# Patient Record
Sex: FEMALE | Race: WHITE | Employment: FULL TIME | URBAN - METROPOLITAN AREA
[De-identification: names, ages, dates, MRNs, and addresses within clinical notes are randomized per-mention and may not be internally consistent; named-entity substitution may affect disease eponyms.]

---

## 2017-08-03 ENCOUNTER — HOSPITAL ENCOUNTER (EMERGENCY)
Age: 22
Discharge: HOME | End: 2017-08-04
Attending: EMERGENCY MEDICINE
Payer: COMMERCIAL

## 2017-08-03 DIAGNOSIS — R19.7 NAUSEA VOMITING AND DIARRHEA: Primary | ICD-10-CM

## 2017-08-03 DIAGNOSIS — R11.2 NAUSEA VOMITING AND DIARRHEA: Primary | ICD-10-CM

## 2017-08-03 LAB
ANION GAP BLD CALC-SCNC: 13 MMOL/L (ref 7–16)
BASOPHILS # BLD AUTO: 0 K/UL (ref 0–0.2)
BASOPHILS # BLD: 0 % (ref 0–2)
BUN SERPL-MCNC: 11 MG/DL (ref 6–23)
CALCIUM SERPL-MCNC: 8.3 MG/DL (ref 8.3–10.4)
CHLORIDE SERPL-SCNC: 103 MMOL/L (ref 98–107)
CO2 SERPL-SCNC: 22 MMOL/L (ref 21–32)
CREAT SERPL-MCNC: 0.85 MG/DL (ref 0.6–1)
DIFFERENTIAL METHOD BLD: ABNORMAL
EOSINOPHIL # BLD: 0.1 K/UL (ref 0–0.8)
EOSINOPHIL NFR BLD: 2 % (ref 0.5–7.8)
ERYTHROCYTE [DISTWIDTH] IN BLOOD BY AUTOMATED COUNT: 13.7 % (ref 11.9–14.6)
GLUCOSE SERPL-MCNC: 102 MG/DL (ref 65–100)
HCG UR QL: NEGATIVE
HCT VFR BLD AUTO: 40.2 % (ref 35.8–46.3)
HGB BLD-MCNC: 13.8 G/DL (ref 11.7–15.4)
IMM GRANULOCYTES # BLD: 0 K/UL (ref 0–0.5)
IMM GRANULOCYTES NFR BLD AUTO: 0.2 % (ref 0–5)
LYMPHOCYTES # BLD AUTO: 12 % (ref 13–44)
LYMPHOCYTES # BLD: 0.6 K/UL (ref 0.5–4.6)
MCH RBC QN AUTO: 27.4 PG (ref 26.1–32.9)
MCHC RBC AUTO-ENTMCNC: 34.3 G/DL (ref 31.4–35)
MCV RBC AUTO: 79.8 FL (ref 79.6–97.8)
MONOCYTES # BLD: 0.3 K/UL (ref 0.1–1.3)
MONOCYTES NFR BLD AUTO: 6 % (ref 4–12)
NEUTS SEG # BLD: 4.2 K/UL (ref 1.7–8.2)
NEUTS SEG NFR BLD AUTO: 80 % (ref 43–78)
PLATELET # BLD AUTO: 196 K/UL (ref 150–450)
PMV BLD AUTO: 10 FL (ref 10.8–14.1)
POTASSIUM SERPL-SCNC: 3.4 MMOL/L (ref 3.5–5.1)
RBC # BLD AUTO: 5.04 M/UL (ref 4.05–5.25)
SODIUM SERPL-SCNC: 138 MMOL/L (ref 136–145)
WBC # BLD AUTO: 5.2 K/UL (ref 4.3–11.1)

## 2017-08-03 PROCEDURE — 96374 THER/PROPH/DIAG INJ IV PUSH: CPT | Performed by: EMERGENCY MEDICINE

## 2017-08-03 PROCEDURE — 74011250636 HC RX REV CODE- 250/636: Performed by: EMERGENCY MEDICINE

## 2017-08-03 PROCEDURE — 99284 EMERGENCY DEPT VISIT MOD MDM: CPT | Performed by: EMERGENCY MEDICINE

## 2017-08-03 PROCEDURE — 85025 COMPLETE CBC W/AUTO DIFF WBC: CPT | Performed by: EMERGENCY MEDICINE

## 2017-08-03 PROCEDURE — 96361 HYDRATE IV INFUSION ADD-ON: CPT | Performed by: EMERGENCY MEDICINE

## 2017-08-03 PROCEDURE — 81003 URINALYSIS AUTO W/O SCOPE: CPT | Performed by: EMERGENCY MEDICINE

## 2017-08-03 PROCEDURE — 74011000250 HC RX REV CODE- 250: Performed by: EMERGENCY MEDICINE

## 2017-08-03 PROCEDURE — 80053 COMPREHEN METABOLIC PANEL: CPT | Performed by: EMERGENCY MEDICINE

## 2017-08-03 PROCEDURE — 80048 BASIC METABOLIC PNL TOTAL CA: CPT | Performed by: EMERGENCY MEDICINE

## 2017-08-03 PROCEDURE — 81025 URINE PREGNANCY TEST: CPT

## 2017-08-03 PROCEDURE — 96375 TX/PRO/DX INJ NEW DRUG ADDON: CPT | Performed by: EMERGENCY MEDICINE

## 2017-08-03 RX ORDER — ONDANSETRON 2 MG/ML
4 INJECTION INTRAMUSCULAR; INTRAVENOUS
Status: COMPLETED | OUTPATIENT
Start: 2017-08-03 | End: 2017-08-03

## 2017-08-03 RX ORDER — FAMOTIDINE 10 MG/ML
20 INJECTION INTRAVENOUS
Status: COMPLETED | OUTPATIENT
Start: 2017-08-03 | End: 2017-08-03

## 2017-08-03 RX ADMIN — FAMOTIDINE 20 MG: 10 INJECTION, SOLUTION INTRAVENOUS at 23:37

## 2017-08-03 RX ADMIN — ONDANSETRON 4 MG: 2 INJECTION INTRAMUSCULAR; INTRAVENOUS at 23:37

## 2017-08-03 RX ADMIN — SODIUM CHLORIDE 2000 ML: 900 INJECTION, SOLUTION INTRAVENOUS at 23:37

## 2017-08-03 NOTE — LETTER
[facility] EMERGENCY DEPT 
[address] [place] [id number] 
[phone] Work/School Note Date: 8/3/2017 To Whom It May concern: 
 
Torres Lopez was seen and treated today in the emergency room by the following provider(s): 
Attending Provider: Lottie Cabrera MD.   
 
Torres Lopez Special Instructions:  Excuse on Friday and Saturday Sincerely, 
 
 
 
 
Lottie Cabrera MD

## 2017-08-03 NOTE — Clinical Note
Clear liquids and advance his nausea and vomiting improved Nausea/vomiting, as needed: Zofran 
diarrhea as needed: Imodium

## 2017-08-04 VITALS
DIASTOLIC BLOOD PRESSURE: 50 MMHG | RESPIRATION RATE: 16 BRPM | BODY MASS INDEX: 29.06 KG/M2 | TEMPERATURE: 100.4 F | HEART RATE: 90 BPM | WEIGHT: 164 LBS | HEIGHT: 63 IN | SYSTOLIC BLOOD PRESSURE: 101 MMHG | OXYGEN SATURATION: 98 %

## 2017-08-04 LAB
ALBUMIN SERPL BCP-MCNC: 3.3 G/DL (ref 3.5–5)
ALBUMIN/GLOB SERPL: 0.8 {RATIO} (ref 1.2–3.5)
ALP SERPL-CCNC: 52 U/L (ref 50–136)
ALT SERPL-CCNC: 25 U/L (ref 12–65)
ANION GAP BLD CALC-SCNC: 13 MMOL/L (ref 7–16)
AST SERPL W P-5'-P-CCNC: 25 U/L (ref 15–37)
BACTERIA SPEC CULT: NEGATIVE
BACTERIA SPEC CULT: NORMAL
BILIRUB SERPL-MCNC: 0.6 MG/DL (ref 0.2–1.1)
BUN SERPL-MCNC: 11 MG/DL (ref 6–23)
CALCIUM SERPL-MCNC: 8.2 MG/DL (ref 8.3–10.4)
CHLORIDE SERPL-SCNC: 102 MMOL/L (ref 98–107)
CO2 SERPL-SCNC: 22 MMOL/L (ref 21–32)
CREAT SERPL-MCNC: 0.81 MG/DL (ref 0.6–1)
GLOBULIN SER CALC-MCNC: 3.9 G/DL (ref 2.3–3.5)
GLUCOSE SERPL-MCNC: 100 MG/DL (ref 65–100)
POTASSIUM SERPL-SCNC: 3.3 MMOL/L (ref 3.5–5.1)
PROT SERPL-MCNC: 7.2 G/DL (ref 6.3–8.2)
SERVICE CMNT-IMP: NORMAL
SODIUM SERPL-SCNC: 137 MMOL/L (ref 136–145)

## 2017-08-04 PROCEDURE — 87493 C DIFF AMPLIFIED PROBE: CPT | Performed by: EMERGENCY MEDICINE

## 2017-08-04 PROCEDURE — 74011250637 HC RX REV CODE- 250/637: Performed by: EMERGENCY MEDICINE

## 2017-08-04 RX ORDER — LOPERAMIDE HYDROCHLORIDE 2 MG/1
2 CAPSULE ORAL
Status: COMPLETED | OUTPATIENT
Start: 2017-08-04 | End: 2017-08-04

## 2017-08-04 RX ORDER — ONDANSETRON 8 MG/1
8 TABLET, ORALLY DISINTEGRATING ORAL
Qty: 8 TAB | Refills: 1 | Status: SHIPPED | OUTPATIENT
Start: 2017-08-04 | End: 2017-12-28

## 2017-08-04 RX ADMIN — LOPERAMIDE HYDROCHLORIDE 2 MG: 2 CAPSULE ORAL at 01:15

## 2017-08-04 NOTE — ED TRIAGE NOTES
Diarrhea started yesterday. Nausea, vomiting and fever started today.   States that her abd is cramping

## 2017-08-04 NOTE — DISCHARGE INSTRUCTIONS
Nausea and Vomiting: Care Instructions  Your Care Instructions    When you are nauseated, you may feel weak and sweaty and notice a lot of saliva in your mouth. Nausea often leads to vomiting. Most of the time you do not need to worry about nausea and vomiting, but they can be signs of other illnesses. Two common causes of nausea and vomiting are stomach flu and food poisoning. Nausea and vomiting from viral stomach flu will usually start to improve within 24 hours. Nausea and vomiting from food poisoning may last from 12 to 48 hours. The doctor has checked you carefully, but problems can develop later. If you notice any problems or new symptoms, get medical treatment right away. Follow-up care is a key part of your treatment and safety. Be sure to make and go to all appointments, and call your doctor if you are having problems. It's also a good idea to know your test results and keep a list of the medicines you take. How can you care for yourself at home? · To prevent dehydration, drink plenty of fluids, enough so that your urine is light yellow or clear like water. Choose water and other caffeine-free clear liquids until you feel better. If you have kidney, heart, or liver disease and have to limit fluids, talk with your doctor before you increase the amount of fluids you drink. · Rest in bed until you feel better. · When you are able to eat, try clear soups, mild foods, and liquids until all symptoms are gone for 12 to 48 hours. Other good choices include dry toast, crackers, cooked cereal, and gelatin dessert, such as Jell-O. When should you call for help? Call 911 anytime you think you may need emergency care. For example, call if:  · You passed out (lost consciousness). Call your doctor now or seek immediate medical care if:  · You have symptoms of dehydration, such as:  ¨ Dry eyes and a dry mouth. ¨ Passing only a little dark urine.   ¨ Feeling thirstier than usual.  · You have new or worsening belly pain. · You have a new or higher fever. · You vomit blood or what looks like coffee grounds. Watch closely for changes in your health, and be sure to contact your doctor if:  · You have ongoing nausea and vomiting. · Your vomiting is getting worse. · Your vomiting lasts longer than 2 days. · You are not getting better as expected. Where can you learn more? Go to http://harshil-heydi.info/. Enter 25 [id number] in the search box to learn more about \"Nausea and Vomiting: Care Instructions. \"  Current as of: March 20, 2017  Content Version: 11.3  © 4265-8167 [employer]. Care instructions adapted under license by [employer] (which disclaims liability or warranty for this information). If you have questions about a medical condition or this instruction, always ask your healthcare professional. [address] any warranty or liability for your use of this information. Diarrhea: Care Instructions  Your Care Instructions    Diarrhea is loose, watery stools (bowel movements). The exact cause is often hard to find. Sometimes diarrhea is your body's way of getting rid of what caused an upset stomach. Viruses, food poisoning, and many medicines can cause diarrhea. Some people get diarrhea in response to emotional stress, anxiety, or certain foods. Almost everyone has diarrhea now and then. It usually isn't serious, and your stools will return to normal soon. The important thing to do is replace the fluids you have lost, so you can prevent dehydration. The doctor has checked you carefully, but problems can develop later. If you notice any problems or new symptoms, get medical treatment right away. Follow-up care is a key part of your treatment and safety. Be sure to make and go to all appointments, and call your doctor if you are having problems. It's also a good idea to know your test results and keep a list of the medicines you take.   How can you care for yourself at home? · Watch for signs of dehydration, which means your body has lost too much water. Dehydration is a serious condition and should be treated right away. Signs of dehydration are:  ¨ Increasing thirst and dry eyes and mouth. ¨ Feeling faint or lightheaded. ¨ Darker urine, and a smaller amount of urine than normal.  · To prevent dehydration, drink plenty of fluids, enough so that your urine is light yellow or clear like water. Choose water and other caffeine-free clear liquids until you feel better. If you have kidney, heart, or liver disease and have to limit fluids, talk with your doctor before you increase the amount of fluids you drink. · Begin eating small amounts of mild foods the next day, if you feel like it. ¨ Try yogurt that has live cultures of Lactobacillus. (Check the label.)  ¨ Avoid spicy foods, fruits, alcohol, and caffeine until 48 hours after all symptoms are gone. ¨ Avoid chewing gum that contains sorbitol. ¨ Avoid dairy products (except for yogurt with Lactobacillus) while you have diarrhea and for 3 days after symptoms are gone. · The doctor may recommend that you take over-the-counter medicine, such as loperamide (Imodium), if you still have diarrhea after 6 hours. Read and follow all instructions on the label. Do not use this medicine if you have bloody diarrhea, a high fever, or other signs of serious illness. Call your doctor if you think you are having a problem with your medicine. When should you call for help? Call 911 anytime you think you may need emergency care. For example, call if:  · You passed out (lost consciousness). · Your stools are maroon or very bloody. Call your doctor now or seek immediate medical care if:  · You are dizzy or lightheaded, or you feel like you may faint. · Your stools are black and look like tar, or they have streaks of blood. · You have new or worse belly pain.   · You have symptoms of dehydration, such as:  ¨ Dry eyes and a dry mouth. ¨ Passing only a little dark urine. ¨ Feeling thirstier than usual.  · You have a new or higher fever. Watch closely for changes in your health, and be sure to contact your doctor if:  · Your diarrhea is getting worse. · You see pus in the diarrhea. · You are not getting better after 2 days (48 hours). Where can you learn more? Go to http://harshil-heydi.info/. Enter B034 in the search box to learn more about \"Diarrhea: Care Instructions. \"  Current as of: March 20, 2017  Content Version: 11.3  © 3613-8682 [employer]. Care instructions adapted under license by [employer] (which disclaims liability or warranty for this information). If you have questions about a medical condition or this instruction, always ask your healthcare professional. [address] any warranty or liability for your use of this information.

## 2018-01-03 PROBLEM — Z34.80 PRENATAL CARE OF MULTIGRAVIDA, ANTEPARTUM: Status: ACTIVE | Noted: 2018-01-03

## 2018-07-02 PROBLEM — Z34.80 PRENATAL CARE OF MULTIGRAVIDA, ANTEPARTUM: Status: RESOLVED | Noted: 2018-01-03 | Resolved: 2018-07-02

## 2018-07-03 ENCOUNTER — HOSPITAL ENCOUNTER (OUTPATIENT)
Dept: MAMMOGRAPHY | Age: 23
Discharge: HOME | End: 2018-07-03
Attending: OBSTETRICS & GYNECOLOGY
Payer: COMMERCIAL

## 2018-07-03 DIAGNOSIS — N63.10 BREAST MASS, RIGHT: ICD-10-CM

## 2018-07-03 PROCEDURE — 76642 ULTRASOUND BREAST LIMITED: CPT

## 2018-10-15 PROBLEM — Z34.80 PRENATAL CARE OF MULTIGRAVIDA, ANTEPARTUM: Status: ACTIVE | Noted: 2018-10-15

## 2018-12-29 ENCOUNTER — HOSPITAL ENCOUNTER (OUTPATIENT)
Age: 23
Discharge: HOME | End: 2018-12-29
Attending: OBSTETRICS & GYNECOLOGY | Admitting: OBSTETRICS & GYNECOLOGY
Payer: COMMERCIAL

## 2018-12-29 VITALS
WEIGHT: 177 LBS | RESPIRATION RATE: 16 BRPM | HEIGHT: 63 IN | DIASTOLIC BLOOD PRESSURE: 79 MMHG | SYSTOLIC BLOOD PRESSURE: 114 MMHG | HEART RATE: 99 BPM | TEMPERATURE: 98.1 F | BODY MASS INDEX: 31.36 KG/M2

## 2018-12-29 PROBLEM — O26.892 PELVIC PAIN IN ANTEPARTUM PERIOD IN SECOND TRIMESTER: Status: ACTIVE | Noted: 2018-12-29

## 2018-12-29 PROBLEM — R10.2 PELVIC PAIN IN ANTEPARTUM PERIOD IN SECOND TRIMESTER: Status: ACTIVE | Noted: 2018-12-29

## 2018-12-29 LAB
ALBUMIN SERPL-MCNC: 2.9 G/DL (ref 3.5–5)
ALBUMIN/GLOB SERPL: 0.8 {RATIO}
ALP SERPL-CCNC: 43 U/L (ref 50–136)
ALT SERPL-CCNC: 17 U/L (ref 12–65)
ANION GAP SERPL CALC-SCNC: 8 MMOL/L
AST SERPL-CCNC: 12 U/L (ref 15–37)
BILIRUB SERPL-MCNC: 0.4 MG/DL (ref 0.2–1.1)
BUN SERPL-MCNC: 10 MG/DL (ref 6–23)
CALCIUM SERPL-MCNC: 8.5 MG/DL (ref 8.3–10.4)
CHLORIDE SERPL-SCNC: 108 MMOL/L (ref 98–107)
CO2 SERPL-SCNC: 22 MMOL/L (ref 21–32)
CREAT SERPL-MCNC: 0.58 MG/DL (ref 0.6–1)
ERYTHROCYTE [DISTWIDTH] IN BLOOD BY AUTOMATED COUNT: 13.3 % (ref 11.9–14.6)
GLOBULIN SER CALC-MCNC: 3.5 G/DL (ref 2.3–3.5)
GLUCOSE SERPL-MCNC: 94 MG/DL (ref 65–100)
HCT VFR BLD AUTO: 33.6 % (ref 35.8–46.3)
HGB BLD-MCNC: 11 G/DL (ref 11.7–15.4)
MCH RBC QN AUTO: 30.2 PG (ref 26.1–32.9)
MCHC RBC AUTO-ENTMCNC: 32.7 G/DL (ref 31.4–35)
MCV RBC AUTO: 92.3 FL (ref 79.6–97.8)
NRBC # BLD: 0 K/UL (ref 0–0.2)
PLATELET # BLD AUTO: 204 K/UL (ref 150–450)
PMV BLD AUTO: 9.8 FL (ref 9.4–12.3)
POTASSIUM SERPL-SCNC: 4.2 MMOL/L (ref 3.5–5.1)
PROT SERPL-MCNC: 6.4 G/DL
RBC # BLD AUTO: 3.64 M/UL (ref 4.05–5.2)
SODIUM SERPL-SCNC: 138 MMOL/L (ref 136–145)
WBC # BLD AUTO: 8.3 K/UL (ref 4.3–11.1)

## 2018-12-29 PROCEDURE — 85027 COMPLETE CBC AUTOMATED: CPT

## 2018-12-29 PROCEDURE — 99284 EMERGENCY DEPT VISIT MOD MDM: CPT

## 2018-12-29 PROCEDURE — 87086 URINE CULTURE/COLONY COUNT: CPT

## 2018-12-29 PROCEDURE — 59025 FETAL NON-STRESS TEST: CPT

## 2018-12-29 PROCEDURE — 80053 COMPREHEN METABOLIC PANEL: CPT

## 2018-12-29 RX ORDER — CEPHALEXIN 500 MG/1
500 CAPSULE ORAL 3 TIMES DAILY
Qty: 21 CAP | Refills: 0 | Status: SHIPPED | OUTPATIENT
Start: 2018-12-29 | End: 2019-01-05

## 2018-12-30 NOTE — PROGRESS NOTES
DC orders received. DC instructions given to pt. Pt verbalized understanding. Pt dc home accompanied by friend.

## 2018-12-30 NOTE — ED PROVIDER NOTES
Chief Complaint:syncopal episode and pelvic cramping      20 y.o. female [id number] at 19w6d  weeks gestation who is seen for moderate abdominal pain. Pt reports that she had a syncopal episode today while standing in line at [employer]. After that she began having pelvic cramping. No loss of fluid. No vag bleeding. Good fetal movement. No urinary symptoms except for cramping after emptying bladder. HISTORY:    Social History     Substance and Sexual Activity   Sexual Activity Yes    Partners: Male    Birth control/protection: None    Comment: Pregnant     Patient's last menstrual period was 08/12/2018 (exact date).     Social History     Socioeconomic History    Marital status: SINGLE     Spouse name: Not on file    Number of children: 1    Years of education: 15    Highest education level: Not on file   Social Needs    Financial resource strain: Not on file    Food insecurity - worry: Not on file    Food insecurity - inability: Not on file   [employer] needs - medical: Not on file   [employer] needs - non-medical: Not on file   Occupational History    Not on file   Tobacco Use    Smoking status: Never Smoker    Smokeless tobacco: Never Used   Substance and Sexual Activity    Alcohol use: No     Comment: social/none since + UC    Drug use: No    Sexual activity: Yes     Partners: Male     Birth control/protection: None     Comment: Pregnant   Other Topics Concern     Service Not Asked    Blood Transfusions Not Asked    Caffeine Concern Not Asked    Occupational Exposure Not Asked    Hobby Hazards Not Asked    Sleep Concern Not Asked    Stress Concern Not Asked    Weight Concern Not Asked    Special Diet Not Asked    Back Care Not Asked    Exercise Not Asked    Bike Helmet Not Asked   [address] Not Asked    Self-Exams Not Asked   Social History Narrative    Not on file       Past Surgical History:   Procedure Laterality Date    HX CHOLECYSTECTOMY      HX CYST INCISION AND DRAINAGE  1/31/2014    RIGHT INCISION AND DRAINAGE BREAST  performed by Makayla Vaz MD at [address] HX CYST INCISION AND DRAINAGE  2/11/2014    INCISION AND DRAINAGE RIGHT BREAST performed by [facility] at [facility] MAIN OR    HX OTHER SURGICAL      HX WISDOM TEETH EXTRACTION         Past Medical History:   Diagnosis Date    Anemia     Anemia in pregnancy 2/25/2016    Celiac disease     Genital herpes, unspecified     History    Herpes 1/10/2014    History of chicken pox     Mastitis     2 surgeries/right breast    Miscarriage within last 12 months 01/2018    Palpitations     Postpartum depression     no meds currently    Rh negative state in antepartum period 9/19/2013    Rhesus isoimmunization affecting pregnancy     Supervision of normal subsequent pregnancy 10/9/2015    1)HSV 2)rh neg 3)rubella nonimmune 4)anemia     Syncope and collapse     Unspecified ovarian cysts          ROS:  A 12 point review of symptoms negative except for chief complaint as described above. PHYSICAL EXAM:  Blood pressure 114/79, pulse 99, temperature 98.1 °F (36.7 °C), resp. rate 16, height 5' 3\" (1.6 m), weight 80.3 kg (177 lb), last menstrual period 08/12/2018, not currently breastfeeding. Constitutional: The patient appears well, alert, oriented x 3. Cardiovascular: Heart RRR, no murmurs.    Respiratory: Lungs clear, no respiratory distress  GI: Abdomen soft, nontender, no guarding  No fundal tenderness  Musculoskeletal: no cva tenderness  Upper ext: no edema, reflexes +2  Lower ext: no edema, neg paola's, reflexes +2  Skin: no rashes or lesions  Psychiatric:Mood/ Affect: appropriate  Genitourinary: SVE:cl/th- no discharge or bleeding  FHT:+  TOCO:no contractions seen  Bedside ultrasound- subjectively normal trev , lots of fetal movement, tolerated exam without pain  Recent Results (from the past 12 hour(s))   CBC W/O DIFF    Collection Time: 12/29/18  9:41 PM   Result Value Ref Range    WBC 8.3 4.3 - 11.1 K/uL    RBC 3.64 (L) 4.05 - 5.2 M/uL    HGB 11.0 (L) 11.7 - 15.4 g/dL    HCT 33.6 (L) 35.8 - 46.3 %    MCV 92.3 79.6 - 97.8 FL    MCH 30.2 26.1 - 32.9 PG    MCHC 32.7 31.4 - 35.0 g/dL    RDW 13.3 11.9 - 14.6 %    PLATELET 332 382 - 541 K/uL    MPV 9.8 9.4 - 12.3 FL    ABSOLUTE NRBC 0.00 0.0 - 0.2 K/uL   METABOLIC PANEL, COMPREHENSIVE    Collection Time: 12/29/18  9:41 PM   Result Value Ref Range    Sodium 138 136 - 145 mmol/L    Potassium 4.2 3.5 - 5.1 mmol/L    Chloride 108 (H) 98 - 107 mmol/L    CO2 22 21 - 32 mmol/L    Anion gap 8 mmol/L    Glucose 94 65 - 100 mg/dL    BUN 10 6 - 23 MG/DL    Creatinine 0.58 (L) 0.6 - 1.0 MG/DL    GFR est AA >60 >60 ml/min/1.73m2    GFR est non-AA >60 ml/min/1.73m2    Calcium 8.5 8.3 - 10.4 MG/DL    Bilirubin, total 0.4 0.2 - 1.1 MG/DL    ALT (SGPT) 17 12 - 65 U/L    AST (SGOT) 12 (L) 15 - 37 U/L    Alk.  phosphatase 43 (L) 50 - 136 U/L    Protein, total 6.4 g/dL    Albumin 2.9 (L) 3.5 - 5.0 g/dL    Globulin 3.5 2.3 - 3.5 g/dL    A-G Ratio 0.8       I personally reviewed pt's medical record including relevant labs and ultrasounds    Assessment/Plan:  19 yo [id number] at 19w6d with near syncopy and pelvic cramping  Urine culture sent- will call if +; rx keflex given to hold if results are +  Normal labs except mild anemia- continue pnv and iron  Encourage hydration  Keep f/u as scheduled

## 2018-12-30 NOTE — PROGRESS NOTES
Pt presents to [facility] with complaint of abd cramping and says earlier in the day she blacked out but did not fall and hit her head. Pt denies VB or SROM. Dr. Amy Gonzalez to be notified of pt arrival. Doppler .

## 2018-12-30 NOTE — DISCHARGE INSTRUCTIONS
IT WAS A PLEASURE TAKING CARE OF YOU TONIGHT! PLEASE FOLLOW UP WITH PRIMARY OB PHYSICIAN AT NEXT SCHEDULED APT. RETURN TO [facility] IF EXPERIENCING VAGINAL BLEEDING, CONTRACTIONS THAT BECOME STRONGER AND CLOSER TOGETHER, DECREASED FETAL MOVEMENT, OR LEAKING OF FLUID. Weeks 18 to 22 of Your Pregnancy: Care Instructions  Your Care Instructions    Your baby is continuing to develop quickly. At this stage, babies can now suck their thumbs,  firmly with their hands, and open and close their eyelids. Sometime between 18 and 22 weeks, you will start to feel your baby move. At first, these small fetal movements feel like fluttering or \"butterflies. \" Some women say that they feel like gas bubbles. As the baby grows, these movements will become stronger. You may also notice that your baby kicks and hiccups. During this time, you may find that your nausea and fatigue are gone. Overall, you may feel better and have more energy than you did in your first trimester. But you may also have new discomforts now, such as sleep problems or leg cramps. This care sheet can help you ease these discomforts. Follow-up care is a key part of your treatment and safety. Be sure to make and go to all appointments, and call your doctor if you are having problems. It's also a good idea to know your test results and keep a list of the medicines you take. How can you care for yourself at home? Ease sleep problems  · Avoid caffeine in drinks or chocolate late in the day. · Get some exercise every day. · Take a warm shower or bath before bed. · Have a light snack or glass of milk at bedtime. · Do relaxation exercises in bed to calm your mind and body. · Support your legs and back with extra pillows. Try a pillow between your legs if you sleep on your side. · Do not use sleeping pills or alcohol. They could harm your baby. Ease leg cramps  · Do not massage your calf during the cramp. · Sit on a firm bed or chair.  Straighten your leg, and bend your foot (flex your ankle) slowly upward, toward your knee. Bend your toes up and down. · Stand on a cool, flat surface. Stretch your toes upward, and take small steps walking on your heels. · Use a heating pad or hot water bottle to help with muscle ache. Prevent leg cramps  · Be sure to get enough calcium. If you are worried that you are not getting enough, talk to your doctor. · Exercise every day, and stretch your legs before bed. · Take a warm bath before bed, and try leg warmers at night. Where can you learn more? Go to http://harshil-heydi.info/. Enter W767 in the search box to learn more about \"Weeks 18 to 22 of Your Pregnancy: Care Instructions. \"  Current as of: 2017  Content Version: 11.8  © 5997-5654 [employer]. Care instructions adapted under license by [employer] (which disclaims liability or warranty for this information). If you have questions about a medical condition or this instruction, always ask your healthcare professional. [place] [address] any warranty or liability for your use of this information. Pregnancy Precautions: Care Instructions  Your Care Instructions    There is no sure way to prevent labor before your due date ( labor) or to prevent most other pregnancy problems. But there are things you can do to increase your chances of a healthy pregnancy. Go to your appointments, follow your doctor's advice, and take good care of yourself. Eat well, and exercise (if your doctor agrees). And make sure to drink plenty of water. Follow-up care is a key part of your treatment and safety. Be sure to make and go to all appointments, and call your doctor if you are having problems. It's also a good idea to know your test results and keep a list of the medicines you take. How can you care for yourself at home? · Make sure you go to your prenatal appointments.  At each visit, your doctor will check your blood pressure. Your doctor will also check to see if you have protein in your urine. High blood pressure and protein in urine are signs of preeclampsia. This condition can be dangerous for you and your baby. · Drink plenty of fluids, enough so that your urine is light yellow or clear like water. Dehydration can cause contractions. If you have kidney, heart, or liver disease and have to limit fluids, talk with your doctor before you increase the amount of fluids you drink. · Tell your doctor right away if you notice any symptoms of an infection, such as:  ? Burning when you urinate. ? A foul-smelling discharge from your vagina. ? Vaginal itching. ? Unexplained fever. ? Unusual pain or soreness in your uterus or lower belly. · Eat a balanced diet. Include plenty of foods that are high in calcium and iron. ? Foods high in calcium include milk, cheese, yogurt, almonds, and broccoli. ? Foods high in iron include red meat, shellfish, poultry, eggs, beans, raisins, whole-grain bread, and leafy green vegetables. · Do not smoke. If you need help quitting, talk to your doctor about stop-smoking programs and medicines. These can increase your chances of quitting for good. · Do not drink alcohol or use illegal drugs. · Follow your doctor's directions about activity. Your doctor will let you know how much, if any, exercise you can do. · Ask your doctor if you can have sex. If you are at risk for early labor, your doctor may ask you to not have sex. · Take care to prevent falls. During pregnancy, your joints are loose, and your balance is off. Sports such as bicycling, skiing, or in-line skating can increase your risk of falling. And don't ride horses or motorcycles, dive, water ski, scuba dive, or parachute jump while you are pregnant. · Avoid getting very hot. Do not use saunas or hot tubs. Avoid staying out in the sun in hot weather for long periods.  Take acetaminophen (Tylenol) to lower a high fever.  · Do not take any over-the-counter or herbal medicines or supplements without talking to your doctor or pharmacist first.  When should you call for help? Call 911 anytime you think you may need emergency care. For example, call if:    · You passed out (lost consciousness).     · You have severe vaginal bleeding.     · You have severe pain in your belly or pelvis.     · You have had fluid gushing or leaking from your vagina and you know or think the umbilical cord is bulging into your vagina. If this happens, immediately get down on your knees so your rear end (buttocks) is higher than your head. This will decrease the pressure on the cord until help arrives.   [facility] your doctor now or seek immediate medical care if:    · You have signs of preeclampsia, such as:  ? Sudden swelling of your face, hands, or feet. ? New vision problems (such as dimness or blurring). ? A severe headache.     · You have any vaginal bleeding.     · You have belly pain or cramping.     · You have a fever.     · You have had regular contractions (with or without pain) for an hour. This means that you have 8 or more within 1 hour or 4 or more in 20 minutes after you change your position and drink fluids.     · You have a sudden release of fluid from your vagina.     · You have low back pain or pelvic pressure that does not go away.     · You notice that your baby has stopped moving or is moving much less than normal.    Watch closely for changes in your health, and be sure to contact your doctor if you have any problems. Where can you learn more? Go to http://harshil-heydi.info/. Enter [id number] in the search box to learn more about \"Pregnancy Precautions: Care Instructions. \"  Current as of: November 21, 2017  Content Version: 11.8  © 1428-1978 [employer]. Care instructions adapted under license by [employer] (which disclaims liability or warranty for this information).  If you have questions about a medical condition or this instruction, always ask your healthcare professional. [place] [address] any warranty or liability for your use of this information.

## 2019-01-01 LAB
BACTERIA SPEC CULT: NORMAL
SERVICE CMNT-IMP: NORMAL

## 2019-03-01 ENCOUNTER — HOSPITAL ENCOUNTER (OUTPATIENT)
Age: 24
Discharge: HOME | End: 2019-03-01
Attending: OBSTETRICS & GYNECOLOGY | Admitting: OBSTETRICS & GYNECOLOGY
Payer: COMMERCIAL

## 2019-03-01 VITALS
HEIGHT: 63 IN | WEIGHT: 181 LBS | RESPIRATION RATE: 18 BRPM | HEART RATE: 108 BPM | DIASTOLIC BLOOD PRESSURE: 69 MMHG | SYSTOLIC BLOOD PRESSURE: 119 MMHG | BODY MASS INDEX: 32.07 KG/M2 | TEMPERATURE: 99.6 F

## 2019-03-01 PROBLEM — O26.899 ANTEPARTUM DEHYDRATION: Status: ACTIVE | Noted: 2019-03-01

## 2019-03-01 PROBLEM — R11.2 NAUSEA AND VOMITING: Status: ACTIVE | Noted: 2019-03-01

## 2019-03-01 PROBLEM — E86.0 ANTEPARTUM DEHYDRATION: Status: ACTIVE | Noted: 2019-03-01

## 2019-03-01 LAB
GLUCOSE, GLUUPC: NEGATIVE
KETONES UR-MCNC: NORMAL MG/DL
PROT UR QL: NORMAL

## 2019-03-01 PROCEDURE — 59025 FETAL NON-STRESS TEST: CPT

## 2019-03-01 PROCEDURE — 74011250636 HC RX REV CODE- 250/636: Performed by: OBSTETRICS & GYNECOLOGY

## 2019-03-01 PROCEDURE — 99283 EMERGENCY DEPT VISIT LOW MDM: CPT

## 2019-03-01 PROCEDURE — 96360 HYDRATION IV INFUSION INIT: CPT

## 2019-03-01 PROCEDURE — 74011000250 HC RX REV CODE- 250: Performed by: OBSTETRICS & GYNECOLOGY

## 2019-03-01 PROCEDURE — 81002 URINALYSIS NONAUTO W/O SCOPE: CPT | Performed by: OBSTETRICS & GYNECOLOGY

## 2019-03-01 PROCEDURE — 96375 TX/PRO/DX INJ NEW DRUG ADDON: CPT | Performed by: OBSTETRICS & GYNECOLOGY

## 2019-03-01 PROCEDURE — 96374 THER/PROPH/DIAG INJ IV PUSH: CPT

## 2019-03-01 RX ORDER — ONDANSETRON 4 MG/1
4 TABLET, ORALLY DISINTEGRATING ORAL
Qty: 10 TAB | Refills: 0 | Status: SHIPPED | OUTPATIENT
Start: 2019-03-01 | End: 2019-04-11

## 2019-03-01 RX ORDER — ONDANSETRON 4 MG/1
4 TABLET, ORALLY DISINTEGRATING ORAL
Qty: 10 TAB | Refills: 0 | OUTPATIENT
Start: 2019-03-01 | End: 2019-03-01 | Stop reason: SDUPTHER

## 2019-03-01 RX ORDER — SODIUM CHLORIDE, SODIUM LACTATE, POTASSIUM CHLORIDE, CALCIUM CHLORIDE 600; 310; 30; 20 MG/100ML; MG/100ML; MG/100ML; MG/100ML
999 INJECTION, SOLUTION INTRAVENOUS CONTINUOUS
Status: DISPENSED | OUTPATIENT
Start: 2019-03-01 | End: 2019-03-01

## 2019-03-01 RX ORDER — DEXTROSE, SODIUM CHLORIDE, SODIUM LACTATE, POTASSIUM CHLORIDE, AND CALCIUM CHLORIDE 5; .6; .31; .03; .02 G/100ML; G/100ML; G/100ML; G/100ML; G/100ML
150 INJECTION, SOLUTION INTRAVENOUS CONTINUOUS
Status: DISCONTINUED | OUTPATIENT
Start: 2019-03-01 | End: 2019-03-02 | Stop reason: HOSPADM

## 2019-03-01 RX ADMIN — PROMETHAZINE HYDROCHLORIDE 12.5 MG: 25 INJECTION INTRAMUSCULAR; INTRAVENOUS at 20:59

## 2019-03-01 RX ADMIN — SODIUM CHLORIDE, SODIUM LACTATE, POTASSIUM CHLORIDE, AND CALCIUM CHLORIDE 999 ML/HR: 600; 310; 30; 20 INJECTION, SOLUTION INTRAVENOUS at 20:55

## 2019-03-01 RX ADMIN — SODIUM CHLORIDE, SODIUM LACTATE, POTASSIUM CHLORIDE, CALCIUM CHLORIDE, AND DEXTROSE MONOHYDRATE 150 ML/HR: 600; 310; 30; 20; 5 INJECTION, SOLUTION INTRAVENOUS at 21:30

## 2019-03-02 NOTE — ED PROVIDER NOTES
Chief Complaint: nausea and vomiting 
 
 
20 y.o. female [id number] at 28w5d 
weeks gestation who is seen for nausea and vomiting. Pt reports she began with nausea and vomiting early this am around 3:00. Unable to tolerate po today. Pt reports fetal movement was decreased today until arriving in triage. Reports abdominal \"soreness\" and occasional crampy pain. No diarrhea. HISTORY: 
 
Social History Substance and Sexual Activity Sexual Activity Yes  Partners: Male  Birth control/protection: None Comment: Pregnant Patient's last menstrual period was 08/12/2018 (exact date). Social History Socioeconomic History  Marital status: SINGLE Spouse name: Not on file  Number of children: 1  Years of education: 15  
 Highest education level: Not on file Social Needs  Financial resource strain: Not on file  Food insecurity - worry: Not on file  Food insecurity - inability: Not on file  Transportation needs - medical: Not on file  Transportation needs - non-medical: Not on file Occupational History  Not on file Tobacco Use  Smoking status: Never Smoker  Smokeless tobacco: Never Used Substance and Sexual Activity  Alcohol use: No  
  Comment: social/none since + UCG  Drug use: No  
 Sexual activity: Yes  
  Partners: Male Birth control/protection: None Comment: Pregnant Other Topics Concern [address] Service Not Asked  Blood Transfusions Not Asked  Caffeine Concern Not Asked  Occupational Exposure Not Asked [place]ing Hazards Not Asked  Sleep Concern Not Asked  Stress Concern Not Asked  Weight Concern Not Asked  Special Diet Not Asked  Back Care Not Asked  Exercise Not Asked  Bike Helmet Not Asked  Seat Belt Not Asked  Self-Exams Not Asked Social History Narrative  Not on file Past Surgical History:  
Procedure Laterality Date  HX CHOLECYSTECTOMY  HX CYST INCISION AND DRAINAGE  2014 RIGHT INCISION AND DRAINAGE BREAST  performed by Marcel Maloney MD at [address] [place] [address] HX CYST INCISION AND DRAINAGE  2014 INCISION AND DRAINAGE RIGHT BREAST performed by Marcel Maloney MD at [facility] MAIN OR  
 HX OTHER SURGICAL    
 HX WISDOM TEETH EXTRACTION Past Medical History:  
Diagnosis Date  Anemia  Anemia in pregnancy 2016  Celiac disease  Genital herpes, unspecified History  Herpes 1/10/2014  History of chicken pox  Mastitis 2 surgeries/right breast  
 Miscarriage within last 12 months 2018  Palpitations  Postpartum depression   
 no meds currently  Rh negative state in antepartum period 2013  Rhesus isoimmunization affecting pregnancy  Supervision of normal subsequent pregnancy 10/9/2015  
 1)HSV 2)rh neg 3)rubella nonimmune 4)anemia  Syncope and collapse  Unspecified ovarian cysts ROS: 
A 12 point review of symptoms negative except for chief complaint as described above. PHYSICAL EXAM: 
Blood pressure 119/69, pulse (!) 108, temperature 99.6 °F (37.6 °C), resp. rate 18, height 5' 3\" (1.6 m), weight 82.1 kg (181 lb), last menstrual period 2018, not currently breastfeeding. Constitutional: The patient appears well, alert, oriented x 3. Cardiovascular: Heart RRR, no murmurs. Respiratory: Lungs clear, no respiratory distress GI: Abdomen soft, nontender, no guarding No fundal tenderness Musculoskeletal: no cva tenderness Upper ext: no edema, reflexes +2 Lower ext: no edema, neg paola's, reflexes +2 Skin: no rashes or lesions Psychiatric:Mood/ Affect: appropriate Genitourinary: SVE:deferred FHT:appropriate gest age TOCO:rare contraction 
ua - large ketones I personally reviewed pt's medical record including relevant labs and ultrasounds Assessment/Plan: 
21 yo  at 28w5d with acute gastroenteritis- improving but unable to keep much down Will iv hydrate and phenergan iv 
rx zofran

## 2019-03-02 NOTE — PROGRESS NOTES
Pt presents to [facility] with complaints of N/V since this morning. Pt states she called her OB office and was told to rest and stay hydrated and if symptoms didn't get better to go to hospital to be evaluated. Dr. Myriam Ballard aware.

## 2019-03-02 NOTE — DISCHARGE INSTRUCTIONS
DISCHARGE SUMMARY from Nurse    PATIENT INSTRUCTIONS:    After general anesthesia or intravenous sedation, for 24 hours or while taking prescription Narcotics:  · Limit your activities  · Do not drive and operate hazardous machinery  · Do not make important personal or business decisions  · Do  not drink alcoholic beverages  · If you have not urinated within 8 hours after discharge, please contact your surgeon on call. Report the following to your surgeon:  · Excessive pain, swelling, redness or odor of or around the surgical area  · Temperature over 100.5  · Nausea and vomiting lasting longer than 4 hours or if unable to take medications  · Any signs of decreased circulation or nerve impairment to extremity: change in color, persistent  numbness, tingling, coldness or increase pain  · Any questions    What to do at Home:  Recommended activity: Activity as tolerated, no driving and drink plenty of fluids. If you experience any of the following symptoms your water breaks, more than 6 contractions in an hour, and/or bright red vaginal bleeding please follow up with your physician or [facility]. *  Please give a list of your current medications to your Primary Care Provider. *  Please update this list whenever your medications are discontinued, doses are      changed, or new medications (including over-the-counter products) are added. *  Please carry medication information at all times in case of emergency situations. These are general instructions for a healthy lifestyle:    No smoking/ No tobacco products/ Avoid exposure to second hand smoke  Surgeon General's Warning:  Quitting smoking now greatly reduces serious risk to your health.     Obesity, smoking, and sedentary lifestyle greatly increases your risk for illness    A healthy diet, regular physical exercise & weight monitoring are important for maintaining a healthy lifestyle    You may be retaining fluid if you have a history of heart failure or if you experience any of the following symptoms:  Weight gain of 3 pounds or more overnight or 5 pounds in a week, increased swelling in our hands or feet or shortness of breath while lying flat in bed. Please call your doctor as soon as you notice any of these symptoms; do not wait until your next office visit. Recognize signs and symptoms of STROKE:    F-face looks uneven    A-arms unable to move or move unevenly    S-speech slurred or non-existent    T-time-call 911 as soon as signs and symptoms begin-DO NOT go       Back to bed or wait to see if you get better-TIME IS BRAIN. Warning Signs of HEART ATTACK     Call 911 if you have these symptoms:   Chest discomfort. Most heart attacks involve discomfort in the center of the chest that lasts more than a few minutes, or that goes away and comes back. It can feel like uncomfortable pressure, squeezing, fullness, or pain.  Discomfort in other areas of the upper body. Symptoms can include pain or discomfort in one or both arms, the back, neck, jaw, or stomach.  Shortness of breath with or without chest discomfort.  Other signs may include breaking out in a cold sweat, nausea, or lightheadedness. Don't wait more than five minutes to call 911 - MINUTES MATTER! Fast action can save your life. Calling 911 is almost always the fastest way to get lifesaving treatment. Emergency Medical Services staff can begin treatment when they arrive -- up to an hour sooner than if someone gets to the hospital by car. The discharge information has been reviewed with the patient. The patient verbalized understanding. Discharge medications reviewed with the patient and appropriate educational materials and side effects teaching were provided.   ___________________________________________________________________________________________________________________________________        Patient Education        Dehydration: Care Instructions  Your Care Instructions  Dehydration happens when your body loses too much fluid. This might happen when you do not drink enough water or you lose large amounts of fluids from your body because of diarrhea, vomiting, or sweating. Severe dehydration can be life-threatening. Water and minerals called electrolytes help put your body fluids back in balance. Learn the early signs of fluid loss, and drink more fluids to prevent dehydration. Follow-up care is a key part of your treatment and safety. Be sure to make and go to all appointments, and call your doctor if you are having problems. It's also a good idea to know your test results and keep a list of the medicines you take. How can you care for yourself at home? · To prevent dehydration, drink plenty of fluids, enough so that your urine is light yellow or clear like water. Choose water and other caffeine-free clear liquids until you feel better. If you have kidney, heart, or liver disease and have to limit fluids, talk with your doctor before you increase the amount of fluids you drink. · If you do not feel like eating or drinking, try taking small sips of water, sports drinks, or other rehydration drinks. · Get plenty of rest.  To prevent dehydration  · Add more fluids to your diet and daily routine, unless your doctor has told you not to. · During hot weather, drink more fluids. Drink even more fluids if you exercise a lot. Stay away from drinks with alcohol or caffeine. · Watch for the symptoms of dehydration. These include:  ? A dry, sticky mouth. ? Dark yellow urine, and not much of it. ? Dry and sunken eyes. ? Feeling very tired. · Learn what problems can lead to dehydration. These include:  ? Diarrhea, fever, and vomiting. ? Any illness with a fever, such as pneumonia or the flu. ? Activities that cause heavy sweating, such as endurance races and heavy outdoor work in hot or humid weather. ?  Alcohol or drug abuse or withdrawal.  ? Certain medicines, such as cold and allergy pills (antihistamines), diet pills (diuretics), and laxatives. ? Certain diseases, such as diabetes, cancer, and heart or kidney disease. When should you call for help? Call 911 anytime you think you may need emergency care. For example, call if:    · You passed out (lost consciousness).    Call your doctor now or seek immediate medical care if:    · You are confused and cannot think clearly.     · You are dizzy or lightheaded, or you feel like you may faint.     · You have signs of needing more fluids. You have sunken eyes and a dry mouth, and you pass only a little dark urine.     · You cannot keep fluids down.    Watch closely for changes in your health, and be sure to contact your doctor if:    · You are not making tears.     · Your skin is very dry and sags slowly back into place after you pinch it.     · Your mouth and eyes are very dry. Where can you learn more? Go to http://harshilAfterschool.meheydi.info/. Enter F328 in the search box to learn more about \"Dehydration: Care Instructions. \"  Current as of: September 23, 2018  Content Version: 11.9  © 8445-6044 [employer]. Care instructions adapted under license by [employer] (which disclaims liability or warranty for this information). If you have questions about a medical condition or this instruction, always ask your healthcare professional. [place] [address] any warranty or liability for your use of this information. Patient Education        Dehydration: Care Instructions  Your Care Instructions  Dehydration happens when your body loses too much fluid. This might happen when you do not drink enough water or you lose large amounts of fluids from your body because of diarrhea, vomiting, or sweating. Severe dehydration can be life-threatening. Water and minerals called electrolytes help put your body fluids back in balance.  Learn the early signs of fluid loss, and drink more fluids to prevent dehydration. Follow-up care is a key part of your treatment and safety. Be sure to make and go to all appointments, and call your doctor if you are having problems. It's also a good idea to know your test results and keep a list of the medicines you take. How can you care for yourself at home? · To prevent dehydration, drink plenty of fluids, enough so that your urine is light yellow or clear like water. Choose water and other caffeine-free clear liquids until you feel better. If you have kidney, heart, or liver disease and have to limit fluids, talk with your doctor before you increase the amount of fluids you drink. · If you do not feel like eating or drinking, try taking small sips of water, sports drinks, or other rehydration drinks. · Get plenty of rest.  To prevent dehydration  · Add more fluids to your diet and daily routine, unless your doctor has told you not to. · During hot weather, drink more fluids. Drink even more fluids if you exercise a lot. Stay away from drinks with alcohol or caffeine. · Watch for the symptoms of dehydration. These include:  ? A dry, sticky mouth. ? Dark yellow urine, and not much of it. ? Dry and sunken eyes. ? Feeling very tired. · Learn what problems can lead to dehydration. These include:  ? Diarrhea, fever, and vomiting. ? Any illness with a fever, such as pneumonia or the flu. ? Activities that cause heavy sweating, such as endurance races and heavy outdoor work in hot or humid weather. ? Alcohol or drug abuse or withdrawal.  ? Certain medicines, such as cold and allergy pills (antihistamines), diet pills (diuretics), and laxatives. ? Certain diseases, such as diabetes, cancer, and heart or kidney disease. When should you call for help? Call 911 anytime you think you may need emergency care.  For example, call if:    · You passed out (lost consciousness).    Call your doctor now or seek immediate medical care if:    · You are confused and cannot think clearly.     · You are dizzy or lightheaded, or you feel like you may faint.     · You have signs of needing more fluids. You have sunken eyes and a dry mouth, and you pass only a little dark urine.     · You cannot keep fluids down.    Watch closely for changes in your health, and be sure to contact your doctor if:    · You are not making tears.     · Your skin is very dry and sags slowly back into place after you pinch it.     · Your mouth and eyes are very dry. Where can you learn more? Go to http://harshil-heydi.info/. Enter D506 in the search box to learn more about \"Dehydration: Care Instructions. \"  Current as of: September 23, 2018  Content Version: 11.9  © 8834-3710 [employer]. Care instructions adapted under license by [employer] (which disclaims liability or warranty for this information). If you have questions about a medical condition or this instruction, always ask your healthcare professional. [place] [address] any warranty or liability for your use of this information.

## 2019-03-20 ENCOUNTER — HOSPITAL ENCOUNTER (OUTPATIENT)
Age: 24
Discharge: HOME | End: 2019-03-20
Attending: OBSTETRICS & GYNECOLOGY | Admitting: OBSTETRICS & GYNECOLOGY
Payer: COMMERCIAL

## 2019-03-20 VITALS
HEART RATE: 98 BPM | RESPIRATION RATE: 18 BRPM | DIASTOLIC BLOOD PRESSURE: 71 MMHG | SYSTOLIC BLOOD PRESSURE: 125 MMHG | TEMPERATURE: 98.1 F

## 2019-03-20 PROBLEM — O26.893 PELVIC PAIN IN ANTEPARTUM PERIOD IN THIRD TRIMESTER: Status: ACTIVE | Noted: 2019-03-20

## 2019-03-20 PROBLEM — R10.2 PELVIC PAIN IN ANTEPARTUM PERIOD IN THIRD TRIMESTER: Status: ACTIVE | Noted: 2019-03-20

## 2019-03-20 LAB
GLUCOSE, GLUUPC: NEGATIVE
KETONES UR-MCNC: NEGATIVE MG/DL
PROT UR QL: NEGATIVE

## 2019-03-20 PROCEDURE — 59025 FETAL NON-STRESS TEST: CPT

## 2019-03-20 PROCEDURE — 99282 EMERGENCY DEPT VISIT SF MDM: CPT

## 2019-03-20 PROCEDURE — 81002 URINALYSIS NONAUTO W/O SCOPE: CPT | Performed by: OBSTETRICS & GYNECOLOGY

## 2019-03-20 NOTE — PROGRESS NOTES
Patient discharged home self care. Patient verbalizes understanding of discharged instructions. Patient ambulated out.

## 2019-03-20 NOTE — ED PROVIDER NOTES
Chief Complaint: crampy pelvic pain \"painful contractions\" 20 y.o. female [id number] at 31w3d 
weeks gestation who is seen for moderate abdominal pain. Pt reports she has been having ongoing crampy pelvic pain or \"contractions\" ongoing for several weeks; however, today they have been more frequent and more painful. She \"drank a bunch of water\" on the way here and reports that now contractions have stopped. No vag bleeding or discharge. No other c/o. Good fetal movement. HISTORY: 
 
Social History Substance and Sexual Activity Sexual Activity Yes  Partners: Male  Birth control/protection: None Comment: Pregnant Patient's last menstrual period was 08/12/2018 (exact date). Social History Socioeconomic History  Marital status: SINGLE Spouse name: Not on file  Number of children: 1  Years of education: 15  
 Highest education level: Not on file Occupational History  Not on file Social Needs  Financial resource strain: Not on file  Food insecurity:  
  Worry: Not on file Inability: Not on file  Transportation needs:  
  Medical: Not on file Non-medical: Not on file Tobacco Use  Smoking status: Never Smoker  Smokeless tobacco: Never Used Substance and Sexual Activity  Alcohol use: No  
  Comment: social/none since + UCG  Drug use: No  
 Sexual activity: Yes  
  Partners: Male Birth control/protection: None Comment: Pregnant Lifestyle  Physical activity:  
  Days per week: Not on file Minutes per session: Not on file  Stress: Not on file Relationships  Social connections:  
  Talks on phone: Not on file Gets together: Not on file Attends Druze service: Not on file Active member of club or organization: Not on file Attends meetings of clubs or organizations: Not on file Relationship status: Not on file  Intimate partner violence:  
  Fear of current or ex partner: Not on file Emotionally abused: Not on file Physically abused: Not on file Forced sexual activity: Not on file Other Topics Concern [address] Service Not Asked  Blood Transfusions Not Asked  Caffeine Concern Not Asked  Occupational Exposure Not Asked [place] Hazards Not Asked  Sleep Concern Not Asked  Stress Concern Not Asked  Weight Concern Not Asked  Special Diet Not Asked  Back Care Not Asked  Exercise Not Asked  Bike Helmet Not Asked  Seat Belt Not Asked  Self-Exams Not Asked Social History Narrative  Not on file Past Surgical History:  
Procedure Laterality Date  HX CHOLECYSTECTOMY  HX CYST INCISION AND DRAINAGE  1/31/2014 RIGHT INCISION AND DRAINAGE BREAST  performed by Caludia Weiner MD at [address] [place] [address] HX CYST INCISION AND DRAINAGE  2/11/2014 INCISION AND DRAINAGE RIGHT BREAST performed by Claudia Weiner MD at [facility] MAIN OR  
 HX OTHER SURGICAL    
 HX WISDOM TEETH EXTRACTION Past Medical History:  
Diagnosis Date  Anemia  Anemia in pregnancy 2/25/2016  Celiac disease  Genital herpes, unspecified History  Herpes 1/10/2014  History of chicken pox  Mastitis 2 surgeries/right breast  
 Miscarriage within last 12 months 01/2018  Palpitations  Postpartum depression   
 no meds currently  Rh negative state in antepartum period 9/19/2013  Rhesus isoimmunization affecting pregnancy  Supervision of normal subsequent pregnancy 10/9/2015  
 1)HSV 2)rh neg 3)rubella nonimmune 4)anemia  Syncope and collapse  Unspecified ovarian cysts ROS: 
A 12 point review of symptoms negative except for chief complaint as described above. PHYSICAL EXAM: 
Blood pressure 125/71, pulse 98, temperature 98.1 °F (36.7 °C), resp. rate 18, last menstrual period 08/12/2018, not currently breastfeeding. Constitutional: The patient appears well, alert, oriented x 3. Cardiovascular: Heart RRR, no murmurs. Respiratory: Lungs clear, no respiratory distress GI: Abdomen soft, nontender, no guarding No fundal tenderness Musculoskeletal: no cva tenderness Upper ext: no edema, reflexes +2 Lower ext: no edema, neg paola's, reflexes +2 Skin: no rashes or lesions Psychiatric:Mood/ Affect: appropriate Genitourinary: SVE:cl/th/ high FHT:appropriate gest age TOCO:no contractions I personally reviewed pt's medical record including relevant labs and ultrasounds Assessment/Plan: 
19 yo [id number] at 31w3d with pelvic cramping that resolved prior to arrival  
No evidence of  dilation of cervix Jonatan escobar Encouraged hydration Home with precautions

## 2019-03-20 NOTE — DISCHARGE INSTRUCTIONS
Patient Education        Belly Pain in Pregnancy: Care Instructions  Your Care Instructions    When you're pregnant, any belly pain can be a worry. You may not want to call your doctor about every pain you have. But you don't want to miss something that is dangerous for you or your baby. Even if it feels familiar, belly pain can mean something new when you're pregnant. It's important to know when to call your doctor. It will also help to know how to care for yourself at home when your pain is not caused by anything harmful. · When belly pain is more severe or constant, see a doctor right away. · If you're sure your belly pain is a sign of labor, call your doctor. · When belly pain is brief, it's usually a normal part of pregnancy. It might be related to changes in the growing uterus. Or it could be the stretching of ligaments called round ligaments. These ligaments help support the uterus. Round ligament pain can be on either side of your belly. It can also be felt in your hips or groin. Follow-up care is a key part of your treatment and safety. Be sure to make and go to all appointments, and call your doctor if you are having problems. It's also a good idea to know your test results and keep a list of the medicines you take. How can you tell if belly pain is a sign of labor? When belly pain is caused by labor, it can feel like mild or menstrual-like cramps in your lower belly. These cramps are probably contractions. They can happen in your second or third trimester. You may also have:  · A steady, dull ache in your lower back, pelvis, or thighs. · A feeling of pressure in your pelvis or lower belly. · Changes in your vaginal discharge or a sudden release of fluid from the vagina. If you think you are in labor, call your doctor. How can you care for yourself at home? When belly pain is mild and is not a symptom of labor:  · Rest until you feel better. · Take a warm bath.   · Think about what you drink and eat:  ? Drink plenty of fluids. Choose water and other caffeine-free clear liquids until you feel better. ? Try eating small, frequent meals. If your stomach is upset, try bland, low-fat foods like plain rice, broiled chicken, toast, and yogurt. · Think about how you move if you are having brief pains from stretching of the round ligaments. ? Try gentle stretching. ? Move a little more slowly when turning in bed or getting up from a chair, so those ligaments don't stretch quickly. ? Lean forward a bit if you think you are going to cough or sneeze. When should you call for help? Call 911 anytime you think you may need emergency care. For example, call if:    · You have sudden, severe pain in your belly.     · You have severe vaginal bleeding.    Call your doctor now or seek immediate medical care if:    · You have new or worse belly pain or cramping.     · You have any vaginal bleeding.     · You have a fever.     · You have symptoms of preeclampsia, such as:  ? Sudden swelling of your face, hands, or feet. ? New vision problems (such as dimness or blurring). ? A severe headache.     · You think that you may be in labor. This means that you've had at least 8 contractions within 1 hour or at least 4 contractions within 20 minutes, even after you change your position and drink fluids.     · You have symptoms of a urinary tract infection. These may include:  ? Pain or burning when you urinate. ? A frequent need to urinate without being able to pass much urine. ? Pain in the flank, which is just below the rib cage and above the waist on either side of the back. ? Blood in your urine.    Watch closely for changes in your health, and be sure to contact your doctor if you are worried about your or your baby's health. Where can you learn more? Go to http://harshil-heydi.info/. Enter 850 [id number] 193 in the search box to learn more about \"Belly Pain in Pregnancy: Care Instructions. \"  Current as of: September 5, 2018  Content Version: 11.9  © 7933-6133 [employer]. Care instructions adapted under license by [employer] (which disclaims liability or warranty for this information). If you have questions about a medical condition or this instruction, always ask your healthcare professional. [address] any warranty or liability for your use of this information.

## 2019-03-27 PROBLEM — O99.013 ANEMIA DURING PREGNANCY IN THIRD TRIMESTER: Status: ACTIVE | Noted: 2019-03-27

## 2019-04-11 ENCOUNTER — HOSPITAL ENCOUNTER (OUTPATIENT)
Dept: LAB | Age: 24
Discharge: HOME | End: 2019-04-11
Payer: COMMERCIAL

## 2019-04-11 DIAGNOSIS — D50.9 IRON DEFICIENCY ANEMIA DURING PREGNANCY: ICD-10-CM

## 2019-04-11 DIAGNOSIS — O99.019 IRON DEFICIENCY ANEMIA DURING PREGNANCY: ICD-10-CM

## 2019-04-11 LAB
ALBUMIN SERPL-MCNC: 3 G/DL (ref 3.5–5)
ALBUMIN/GLOB SERPL: 0.8 {RATIO} (ref 1.2–3.5)
ALP SERPL-CCNC: 96 U/L (ref 50–136)
ALT SERPL-CCNC: 12 U/L (ref 12–65)
ANION GAP SERPL CALC-SCNC: 6 MMOL/L (ref 7–16)
APPEARANCE UR: CLEAR
AST SERPL-CCNC: 11 U/L (ref 15–37)
BACTERIA URNS QL MICRO: NORMAL /HPF
BASOPHILS # BLD: 0 K/UL (ref 0–0.2)
BASOPHILS NFR BLD: 0 % (ref 0–2)
BILIRUB SERPL-MCNC: 0.7 MG/DL (ref 0.2–1.1)
BILIRUB UR QL: NEGATIVE
BUN SERPL-MCNC: 8 MG/DL (ref 6–23)
CALCIUM SERPL-MCNC: 8.4 MG/DL (ref 8.3–10.4)
CASTS URNS QL MICRO: 0 /LPF
CHLORIDE SERPL-SCNC: 107 MMOL/L (ref 98–107)
CO2 SERPL-SCNC: 23 MMOL/L (ref 21–32)
COLOR UR: YELLOW
CREAT SERPL-MCNC: 0.59 MG/DL (ref 0.6–1)
CRYSTALS URNS QL MICRO: 0 /LPF
DIFFERENTIAL METHOD BLD: ABNORMAL
EOSINOPHIL # BLD: 0.1 K/UL (ref 0–0.8)
EOSINOPHIL NFR BLD: 1 % (ref 0.5–7.8)
EPI CELLS #/AREA URNS HPF: 0 /HPF
ERYTHROCYTE [DISTWIDTH] IN BLOOD BY AUTOMATED COUNT: 13.2 % (ref 11.9–14.6)
ERYTHROCYTE [SEDIMENTATION RATE] IN BLOOD: 43 MM/HR (ref 0–20)
FERRITIN SERPL-MCNC: 3 NG/ML (ref 8–388)
FOLATE SERPL-MCNC: 12.5 NG/ML (ref 3.1–17.5)
GLOBULIN SER CALC-MCNC: 4 G/DL (ref 2.3–3.5)
GLUCOSE SERPL-MCNC: 87 MG/DL (ref 65–100)
GLUCOSE UR STRIP.AUTO-MCNC: NEGATIVE MG/DL
HCT VFR BLD AUTO: 29.1 % (ref 35.8–46.3)
HGB BLD-MCNC: 9.7 G/DL (ref 11.7–15.4)
HGB RETIC QN AUTO: 25 PG (ref 29–35)
HGB UR QL STRIP: NEGATIVE
IMM GRANULOCYTES # BLD AUTO: 0 K/UL (ref 0–0.5)
IMM GRANULOCYTES NFR BLD AUTO: 1 % (ref 0–5)
IMM RETICS NFR: 25 % (ref 3–15.9)
IRON SATN MFR SERPL: 6 %
IRON SERPL-MCNC: 37 UG/DL (ref 35–150)
KETONES UR QL STRIP.AUTO: NEGATIVE MG/DL
LDH SERPL L TO P-CCNC: 150 U/L (ref 100–190)
LEUKOCYTE ESTERASE UR QL STRIP.AUTO: ABNORMAL
LYMPHOCYTES # BLD: 1.1 K/UL (ref 0.5–4.6)
LYMPHOCYTES NFR BLD: 15 % (ref 13–44)
MCH RBC QN AUTO: 26.6 PG (ref 26.1–32.9)
MCHC RBC AUTO-ENTMCNC: 33.3 G/DL (ref 31.4–35)
MCV RBC AUTO: 79.7 FL (ref 79.6–97.8)
MONOCYTES # BLD: 0.4 K/UL (ref 0.1–1.3)
MONOCYTES NFR BLD: 6 % (ref 4–12)
MUCOUS THREADS URNS QL MICRO: 0 /LPF
NEUTS SEG # BLD: 5.6 K/UL (ref 1.7–8.2)
NEUTS SEG NFR BLD: 78 % (ref 43–78)
NITRITE UR QL STRIP.AUTO: NEGATIVE
NRBC # BLD: 0 K/UL (ref 0–0.2)
PH UR STRIP: 7 [PH] (ref 5–9)
PLATELET # BLD AUTO: 187 K/UL (ref 150–450)
PMV BLD AUTO: 9.6 FL (ref 9.4–12.3)
POTASSIUM SERPL-SCNC: 3.8 MMOL/L (ref 3.5–5.1)
PROT SERPL-MCNC: 7 G/DL (ref 6.3–8.2)
PROT UR STRIP-MCNC: NEGATIVE MG/DL
RBC # BLD AUTO: 3.65 M/UL (ref 4.05–5.25)
RBC #/AREA URNS HPF: 0 /HPF
RETICS # AUTO: 0.08 M/UL (ref 0.03–0.1)
RETICS/RBC NFR AUTO: 2.1 % (ref 0.3–2)
SODIUM SERPL-SCNC: 136 MMOL/L (ref 136–145)
SP GR UR REFRACTOMETRY: 1.01 (ref 1–1.02)
T4 FREE SERPL-MCNC: 1.1 NG/DL (ref 0.78–1.46)
TIBC SERPL-MCNC: 584 UG/DL (ref 250–450)
TSH SERPL DL<=0.005 MIU/L-ACNC: 2.22 UIU/ML (ref 0.36–3.74)
URATE SERPL-MCNC: 2.9 MG/DL (ref 2.6–6)
UROBILINOGEN UR QL STRIP.AUTO: 0.2 EU/DL (ref 0.2–1)
VIT B12 SERPL-MCNC: >6000 PG/ML (ref 193–986)
WBC # BLD AUTO: 7.2 K/UL (ref 4.3–11.1)
WBC URNS QL MICRO: NORMAL /HPF

## 2019-04-11 PROCEDURE — 82728 ASSAY OF FERRITIN: CPT

## 2019-04-11 PROCEDURE — 84238 ASSAY NONENDOCRINE RECEPTOR: CPT

## 2019-04-11 PROCEDURE — 84443 ASSAY THYROID STIM HORMONE: CPT

## 2019-04-11 PROCEDURE — 81015 MICROSCOPIC EXAM OF URINE: CPT

## 2019-04-11 PROCEDURE — 82747 ASSAY OF FOLIC ACID RBC: CPT

## 2019-04-11 PROCEDURE — 85025 COMPLETE CBC W/AUTO DIFF WBC: CPT

## 2019-04-11 PROCEDURE — 86038 ANTINUCLEAR ANTIBODIES: CPT

## 2019-04-11 PROCEDURE — 83540 ASSAY OF IRON: CPT

## 2019-04-11 PROCEDURE — 80053 COMPREHEN METABOLIC PANEL: CPT

## 2019-04-11 PROCEDURE — 83021 HEMOGLOBIN CHROMOTOGRAPHY: CPT

## 2019-04-11 PROCEDURE — 82607 VITAMIN B-12: CPT

## 2019-04-11 PROCEDURE — 81003 URINALYSIS AUTO W/O SCOPE: CPT

## 2019-04-11 PROCEDURE — 85046 RETICYTE/HGB CONCENTRATE: CPT

## 2019-04-11 PROCEDURE — 36415 COLL VENOUS BLD VENIPUNCTURE: CPT

## 2019-04-11 PROCEDURE — 85652 RBC SED RATE AUTOMATED: CPT

## 2019-04-11 PROCEDURE — 82746 ASSAY OF FOLIC ACID SERUM: CPT

## 2019-04-11 PROCEDURE — 84439 ASSAY OF FREE THYROXINE: CPT

## 2019-04-11 PROCEDURE — 84550 ASSAY OF BLOOD/URIC ACID: CPT

## 2019-04-11 PROCEDURE — 83615 LACTATE (LD) (LDH) ENZYME: CPT

## 2019-04-12 LAB
ANA SER QL: NEGATIVE
DEPRECATED HGB OTHER BLD-IMP: 0 %
FOLATE BLD-MCNC: 329.5 NG/ML
FOLATE RBC-MCNC: 1173 NG/ML
HCT VFR BLD AUTO: 28.1 % (ref 34–46.6)
HGB A MFR BLD: 98.2 % (ref 96.4–98.8)
HGB A2 MFR BLD COLUMN CHROM: 1.8 % (ref 1.8–3.2)
HGB C MFR BLD: 0 %
HGB F MFR BLD: 0 % (ref 0–2)
HGB FRACT BLD-IMP: NORMAL
HGB S BLD QL SOLY: NEGATIVE
HGB S MFR BLD: 0 %
PERIPHERAL SMEAR,PSM: NORMAL
TRANSFERRIN SERPL-SCNC: 32.5 NMOL/L (ref 12.2–27.3)

## 2019-04-17 ENCOUNTER — HOSPITAL ENCOUNTER (OUTPATIENT)
Age: 24
Discharge: HOME | End: 2019-04-17
Attending: OBSTETRICS & GYNECOLOGY | Admitting: OBSTETRICS & GYNECOLOGY
Payer: COMMERCIAL

## 2019-04-17 ENCOUNTER — HOSPITAL ENCOUNTER (OUTPATIENT)
Dept: INFUSION THERAPY | Age: 24
Discharge: HOME | End: 2019-04-17
Payer: COMMERCIAL

## 2019-04-17 VITALS
RESPIRATION RATE: 20 BRPM | DIASTOLIC BLOOD PRESSURE: 59 MMHG | SYSTOLIC BLOOD PRESSURE: 105 MMHG | TEMPERATURE: 97.9 F | HEART RATE: 121 BPM

## 2019-04-17 DIAGNOSIS — D50.9 IRON DEFICIENCY ANEMIA DURING PREGNANCY: ICD-10-CM

## 2019-04-17 DIAGNOSIS — D50.9 IRON DEFICIENCY ANEMIA DURING PREGNANCY: Primary | ICD-10-CM

## 2019-04-17 DIAGNOSIS — O99.013 ANEMIA DURING PREGNANCY IN THIRD TRIMESTER: ICD-10-CM

## 2019-04-17 DIAGNOSIS — O99.019 IRON DEFICIENCY ANEMIA DURING PREGNANCY: Primary | ICD-10-CM

## 2019-04-17 DIAGNOSIS — O99.019 IRON DEFICIENCY ANEMIA DURING PREGNANCY: ICD-10-CM

## 2019-04-17 PROBLEM — R10.9 ABDOMINAL PAIN DURING PREGNANCY IN THIRD TRIMESTER: Status: ACTIVE | Noted: 2019-04-17

## 2019-04-17 PROBLEM — O26.893 ABDOMINAL PAIN DURING PREGNANCY IN THIRD TRIMESTER: Status: ACTIVE | Noted: 2019-04-17

## 2019-04-17 PROCEDURE — 59025 FETAL NON-STRESS TEST: CPT

## 2019-04-17 PROCEDURE — 96365 THER/PROPH/DIAG IV INF INIT: CPT

## 2019-04-17 PROCEDURE — 74011250636 HC RX REV CODE- 250/636: Performed by: PEDIATRICS

## 2019-04-17 PROCEDURE — 99283 EMERGENCY DEPT VISIT LOW MDM: CPT

## 2019-04-17 RX ORDER — SODIUM CHLORIDE 0.9 % (FLUSH) 0.9 %
10 SYRINGE (ML) INJECTION AS NEEDED
Status: DISCONTINUED | OUTPATIENT
Start: 2019-04-17 | End: 2019-04-18 | Stop reason: HOSPADM

## 2019-04-17 RX ADMIN — FERRIC CARBOXYMALTOSE INJECTION 750 MG: 50 INJECTION, SOLUTION INTRAVENOUS at 15:25

## 2019-04-17 RX ADMIN — Medication 10 ML: at 16:05

## 2019-04-17 RX ADMIN — SODIUM CHLORIDE 500 ML: 900 INJECTION, SOLUTION INTRAVENOUS at 15:20

## 2019-04-17 NOTE — H&P
CC 
Chief Complaint Patient presents with  Contractions 35.3 History: 
 
30 y.o. female  at 30w3d  weeks gestation with a chief complaint of  
Chief Complaint Patient presents with  Contractions 35.3 who requesting evaluation for possible symptoms of  labor. Current symptoms are low back pain and and cramping that began at work several hours ago. Had 3 contractions in 1 hours, caller her dr, and was told to come here. Now the cramps have subsided. No lof, vb. Alleviating factors rest 
Exacerbating factors activity Fetal movement has been normal 
 
HISTORY: 
 
Social History Substance and Sexual Activity Sexual Activity Yes  Partners: Male  Birth control/protection: None Comment: Pregnant OB History  Para Term  AB Living 4 2 2   1 2 SAB TAB Ectopic Molar Multiple Live Births 1       0 2 # Outcome Date GA Lbr Coleman/2nd Weight Sex Delivery Anes PTL Lv  
4 Current           
3 SAB  2 Term 16 38w4d 17:30 / 00:15 3.61 kg F VAGINAL DELI EPIDURAL AN N ORALIA  
1 Term 13 39w1d 07:05 / 01:37 3.64 kg M VAGINAL DELI EPIDURAL AN N ORALIA Social History Socioeconomic History  Marital status: SINGLE Spouse name: Not on file  Number of children: 1  Years of education: 15  
 Highest education level: Not on file Occupational History  Not on file Social Needs  Financial resource strain: Not on file  Food insecurity:  
  Worry: Not on file Inability: Not on file  Transportation needs:  
  Medical: Not on file Non-medical: Not on file Tobacco Use  Smoking status: Never Smoker  Smokeless tobacco: Never Used Substance and Sexual Activity  Alcohol use: No  
  Comment: social/none since + [facility]  Drug use: No  
 Sexual activity: Yes  
  Partners: Male Birth control/protection: None Comment: Pregnant Lifestyle  Physical activity: Days per week: Not on file Minutes per session: Not on file  Stress: Not on file Relationships  Social connections:  
  Talks on phone: Not on file Gets together: Not on file Attends Mandaeism service: Not on file Active member of club or organization: Not on file Attends meetings of clubs or organizations: Not on file Relationship status: Not on file  Intimate partner violence:  
  Fear of current or ex partner: Not on file Emotionally abused: Not on file Physically abused: Not on file Forced sexual activity: Not on file Other Topics Concern [address] Service Not Asked  Blood Transfusions Not Asked  Caffeine Concern Not Asked  Occupational Exposure Not Asked [place] Hazards Not Asked  Sleep Concern Not Asked  Stress Concern Not Asked  Weight Concern Not Asked  Special Diet Not Asked  Back Care Not Asked  Exercise Not Asked  Bike Helmet Not Asked  Seat Belt Not Asked  Self-Exams Not Asked Social History Narrative  Not on file Past Surgical History:  
Procedure Laterality Date  HX CHOLECYSTECTOMY  HX CYST INCISION AND DRAINAGE  1/31/2014 RIGHT INCISION AND DRAINAGE BREAST  performed by Aubrey Roberson MD at [address] [place] [address] HX CYST INCISION AND DRAINAGE  2/11/2014 INCISION AND DRAINAGE RIGHT BREAST performed by Aubrey Roberson MD at [facility] MAIN OR  
 HX OTHER SURGICAL    
 HX WISDOM TEETH EXTRACTION Past Medical History:  
Diagnosis Date  Anemia  Anemia in pregnancy 2/25/2016  Celiac disease  Genital herpes, unspecified History  Herpes 1/10/2014  History of chicken pox  Mastitis 2 surgeries/right breast  
 Miscarriage within last 12 months 01/2018  Palpitations  Postpartum depression   
 no meds currently  Rh negative state in antepartum period 9/19/2013  Rhesus isoimmunization affecting pregnancy  Supervision of normal subsequent pregnancy 10/9/2015 1)HSV 2)rh neg 3)rubella nonimmune 4)anemia  Syncope and collapse  Unspecified ovarian cysts ROS: 
Negative: 
Negataive 10 point ROS other than noted in hpi. PHYSICAL EXAM: 
Last menstrual period 08/12/2018, not currently breastfeeding. General: well developed and well nourished Resp:  breath sounds clear and equal bilaterally Card:  RRR, no MRG, Tachy or murmur Abd: WNL. Uterine activity:none Pelvic:    
 External- normal EGBSU w/o lesions SSE-not done Cervical Exam: 0 cm dilated 10% effaced   
-3 station Ext:  - no edema Tests performed: 
 
 UA: normal 
 Amniosure: not done Ultrasound:  Not done Fetal Assessment:  
  
 NST:   Baseline FHR: 150 per minute Fetal heart variability: moderate Fetal Heart Rate decelerations: none Fetal Heart Rate accelerations: yes Impression: reassuring nst 
 
I have personally reviewed the patient's history, prenatal record, and pertinent test results. previous provider notes support my clinical impression. Does have a hx of hsv, and was noted to be breech at the office. Assessment: 
 
30 y.o. female at 35w3d weeks gestation Not in labor. with reassuring fetal status Plan: 
 
 
Discharge home with the following: Activity: ad natacha Diet: as tolerated. Follow up in office: as scheduled VITAMINS_IRON Regine Ferris MD

## 2019-04-17 NOTE — DISCHARGE INSTRUCTIONS
Keep next scheduled appointment. Hydrate with 8-10 glasses a water per day. Today's goal is to dink water until urine is pale yellow.

## 2019-04-17 NOTE — PROGRESS NOTES
Arrived to the [facility]. Injectafer completed. Patient tolerated well. Any issues or concerns during appointment: none. Patient aware of next infusion appointment on 4/25/19 at 1600. Discharged ambulatory.  
 
Guy Ann RN

## 2019-04-17 NOTE — PROGRESS NOTES
[facility] ED Admit to MELANIE 1. EFM and TOCO applied. States she is having UC since yesterday after noon that are now 3-4/10 and she is having to stop and breath with them. States she feels dizzy and light headed with the UC. Baby breech on last US. Hx of 2 vaginal deliveries. Abd palpated soft. Positive fetal movement noted.

## 2019-04-25 ENCOUNTER — HOSPITAL ENCOUNTER (OUTPATIENT)
Dept: INFUSION THERAPY | Age: 24
Discharge: HOME | End: 2019-04-25
Payer: COMMERCIAL

## 2019-04-25 VITALS
DIASTOLIC BLOOD PRESSURE: 58 MMHG | SYSTOLIC BLOOD PRESSURE: 109 MMHG | HEART RATE: 91 BPM | WEIGHT: 191 LBS | OXYGEN SATURATION: 100 % | RESPIRATION RATE: 19 BRPM | BODY MASS INDEX: 33.3 KG/M2 | TEMPERATURE: 98 F

## 2019-04-25 DIAGNOSIS — O99.019 IRON DEFICIENCY ANEMIA DURING PREGNANCY: ICD-10-CM

## 2019-04-25 DIAGNOSIS — O99.013 ANEMIA DURING PREGNANCY IN THIRD TRIMESTER: Primary | ICD-10-CM

## 2019-04-25 DIAGNOSIS — D50.9 IRON DEFICIENCY ANEMIA DURING PREGNANCY: ICD-10-CM

## 2019-04-25 PROCEDURE — 74011250636 HC RX REV CODE- 250/636: Performed by: PEDIATRICS

## 2019-04-25 PROCEDURE — 96365 THER/PROPH/DIAG IV INF INIT: CPT

## 2019-04-25 RX ORDER — SODIUM CHLORIDE 0.9 % (FLUSH) 0.9 %
10 SYRINGE (ML) INJECTION AS NEEDED
Status: DISCONTINUED | OUTPATIENT
Start: 2019-04-25 | End: 2019-04-26 | Stop reason: HOSPADM

## 2019-04-25 RX ADMIN — FERRIC CARBOXYMALTOSE INJECTION 750 MG: 50 INJECTION, SOLUTION INTRAVENOUS at 16:13

## 2019-04-25 RX ADMIN — SODIUM CHLORIDE 500 ML: 900 INJECTION, SOLUTION INTRAVENOUS at 16:33

## 2019-04-25 RX ADMIN — Medication 10 ML: at 17:03

## 2019-04-25 RX ADMIN — Medication 10 ML: at 16:08

## 2019-04-25 NOTE — PROGRESS NOTES
[address] April 25, 2019 RE: Shay Gauthier To Whom It May Concern, Please excuse Shay Gauthier for the date of April, 25, 2019. Please feel free to contact my office if you have any questions or concerns. Thank you for your assistance in this matter. Sincerely, Peola Sicard, RN

## 2019-04-25 NOTE — PROGRESS NOTES
Arrived to the [facility]. Assessment complete. Injectafer completed. Patient tolerated without porblems. Any issues or concerns during appointment: None. Patient aware she is to follow up with Dr. Jeffy Panda on 10/15/2019. Discharged ambulatory.

## 2019-05-03 ENCOUNTER — HOSPITAL ENCOUNTER (OUTPATIENT)
Age: 24
Discharge: HOME | End: 2019-05-03
Attending: OBSTETRICS & GYNECOLOGY | Admitting: OBSTETRICS & GYNECOLOGY
Payer: COMMERCIAL

## 2019-05-03 VITALS
DIASTOLIC BLOOD PRESSURE: 65 MMHG | TEMPERATURE: 98 F | HEART RATE: 101 BPM | HEIGHT: 63 IN | BODY MASS INDEX: 34.02 KG/M2 | SYSTOLIC BLOOD PRESSURE: 114 MMHG | WEIGHT: 192 LBS | RESPIRATION RATE: 20 BRPM

## 2019-05-03 PROBLEM — O42.90 AMNIOTIC FLUID LEAKING: Status: ACTIVE | Noted: 2019-05-03

## 2019-05-03 LAB
A1 MICROGLOB PLACENTAL VAG QL: NEGATIVE
CONTROL LINE PRESENT?: NORMAL
EXPIRATION DATE: NORMAL
INTERNAL NEGATIVE CONTROL: NORMAL
KIT LOT NO.: NORMAL

## 2019-05-03 PROCEDURE — 99282 EMERGENCY DEPT VISIT SF MDM: CPT

## 2019-05-03 PROCEDURE — 84112 EVAL AMNIOTIC FLUID PROTEIN: CPT | Performed by: OBSTETRICS & GYNECOLOGY

## 2019-05-03 PROCEDURE — 59025 FETAL NON-STRESS TEST: CPT

## 2019-05-03 NOTE — ED PROVIDER NOTES
Chief Complaint: vaginal leaking 
 
 
30 y.o. female  at 37w5d 
weeks gestation who is seen for several hours of vaginal leaking since 12:30 this afternoon. Pt also notes 12 hours of mild pelvic cramping. Pt notes good FM. She denies VB, gush of fluid, UTI or PEC symptoms. Pt does have a h/o genital HSV for which she takes valtrex. Pt notes she has had no recent outbreaks of genital lesions. HISTORY: 
 
Social History Substance and Sexual Activity Sexual Activity Yes  Partners: Male  Birth control/protection: None Comment: Pregnant Patient's last menstrual period was 2018 (exact date). Social History Socioeconomic History  Marital status: SINGLE Spouse name: Not on file  Number of children: 1  Years of education: 15  
 Highest education level: Not on file Occupational History  Not on file Social Needs  Financial resource strain: Not on file  Food insecurity:  
  Worry: Not on file Inability: Not on file  Transportation needs:  
  Medical: Not on file Non-medical: Not on file Tobacco Use  Smoking status: Never Smoker  Smokeless tobacco: Never Used Substance and Sexual Activity  Alcohol use: No  
  Comment: social/none since + [facility]  Drug use: No  
 Sexual activity: Yes  
  Partners: Male Birth control/protection: None Comment: Pregnant Lifestyle  Physical activity:  
  Days per week: Not on file Minutes per session: Not on file  Stress: Not on file Relationships  Social connections:  
  Talks on phone: Not on file Gets together: Not on file Attends Sikh service: Not on file Active member of club or organization: Not on file Attends meetings of clubs or organizations: Not on file Relationship status: Not on file  Intimate partner violence:  
  Fear of current or ex partner: Not on file Emotionally abused: Not on file Physically abused: Not on file Forced sexual activity: Not on file Other Topics Concern [address] Service Not Asked  Blood Transfusions Not Asked  Caffeine Concern Not Asked  Occupational Exposure Not Asked [place] Hazards Not Asked  Sleep Concern Not Asked  Stress Concern Not Asked  Weight Concern Not Asked  Special Diet Not Asked  Back Care Not Asked  Exercise Not Asked  Bike Helmet Not Asked  Seat Belt Not Asked  Self-Exams Not Asked Social History Narrative  Not on file Past Surgical History:  
Procedure Laterality Date  BREAST SURGERY PROCEDURE UNLISTED  HX CHOLECYSTECTOMY  HX CYST INCISION AND DRAINAGE  1/31/2014 RIGHT INCISION AND DRAINAGE BREAST  performed by Alyssa Muse MD at [address] [place] [address] HX CYST INCISION AND DRAINAGE  2/11/2014 INCISION AND DRAINAGE RIGHT BREAST performed by Alyssa Muse MD at [facility] MAIN OR  
 HX OTHER SURGICAL    
 HX WISDOM TEETH EXTRACTION Past Medical History:  
Diagnosis Date  Anemia  Anemia in pregnancy 2/25/2016  Celiac disease  Genital herpes, unspecified History  Herpes 1/10/2014  History of chicken pox  Mastitis 2 surgeries/right breast  
 Miscarriage within last 12 months 01/2018  Palpitations  Postpartum depression   
 no meds currently  Rh negative state in antepartum period 9/19/2013  Rhesus isoimmunization affecting pregnancy  Supervision of normal subsequent pregnancy 10/9/2015  
 1)HSV 2)rh neg 3)rubella nonimmune 4)anemia  Syncope and collapse  Unspecified ovarian cysts ROS: 
An 8 point review of symptoms negative except for chief complaint as described above. PHYSICAL EXAM: 
Blood pressure 114/65, pulse (!) 101, temperature 98 °F (36.7 °C), resp. rate 20, height 5' 3\" (1.6 m), weight 87.1 kg (192 lb), last menstrual period 08/12/2018, not currently breastfeeding. Constitutional: The patient appears well, alert, oriented x 3. Cardiovascular: Heart RRR, no murmurs. Respiratory: Lungs clear, no respiratory distress GI: Abdomen soft, nontender, no guarding No fundal tenderness Lower ext: no edema, neg paola's, reflexes +2 Psychiatric:Mood/ Affect: appropriate Genitourinary: SVE: 1cm/50%/vtx/-2; no fluid in the vagina; no lesions of the ext genitalia noted FHT: Category 1 with mod variability TOCO: no ctx Amnisure: negative I personally reviewed pt's medical record including relevant labs and ultrasounds Assessment/Plan: 
There is no evidence of ROM or labor. Fetal well being is demonstrated. Pt discharged to home with labor precautions. Pt to f/u with her PObP.

## 2019-05-03 NOTE — PROGRESS NOTES
05/03/19 1503 Cervical Exam  
Dilation (cm) 1 Eff 50 % [facility] SVE per dr Santos Sink, no amniotic fluid noted

## 2019-05-03 NOTE — PROGRESS NOTES
Pt to room OB1 for triage with chief complaint of SROM. Assessment begins, EFM and Diaz applied to a soft non tender abdomin and tracing well.

## 2019-05-03 NOTE — DISCHARGE INSTRUCTIONS
Patient Education        Pregnancy Precautions: Care Instructions  Your Care Instructions    There is no sure way to prevent labor before your due date ( labor) or to prevent most other pregnancy problems. But there are things you can do to increase your chances of a healthy pregnancy. Go to your appointments, follow your doctor's advice, and take good care of yourself. Eat well, and exercise (if your doctor agrees). And make sure to drink plenty of water. Follow-up care is a key part of your treatment and safety. Be sure to make and go to all appointments, and call your doctor if you are having problems. It's also a good idea to know your test results and keep a list of the medicines you take. How can you care for yourself at home? · Make sure you go to your prenatal appointments. At each visit, your doctor will check your blood pressure. Your doctor will also check to see if you have protein in your urine. High blood pressure and protein in urine are signs of preeclampsia. This condition can be dangerous for you and your baby. · Drink plenty of fluids, enough so that your urine is light yellow or clear like water. Dehydration can cause contractions. If you have kidney, heart, or liver disease and have to limit fluids, talk with your doctor before you increase the amount of fluids you drink. · Tell your doctor right away if you notice any symptoms of an infection, such as:  ? Burning when you urinate. ? A foul-smelling discharge from your vagina. ? Vaginal itching. ? Unexplained fever. ? Unusual pain or soreness in your uterus or lower belly. · Eat a balanced diet. Include plenty of foods that are high in calcium and iron. ? Foods high in calcium include milk, cheese, yogurt, almonds, and broccoli. ? Foods high in iron include red meat, shellfish, poultry, eggs, beans, raisins, whole-grain bread, and leafy green vegetables. · Do not smoke.  If you need help quitting, talk to your doctor about stop-smoking programs and medicines. These can increase your chances of quitting for good. · Do not drink alcohol or use illegal drugs. · Follow your doctor's directions about activity. Your doctor will let you know how much, if any, exercise you can do. · Ask your doctor if you can have sex. If you are at risk for early labor, your doctor may ask you to not have sex. · Take care to prevent falls. During pregnancy, your joints are loose, and your balance is off. Sports such as bicycling, skiing, or in-line skating can increase your risk of falling. And don't ride horses or motorcycles, dive, water ski, scuba dive, or parachute jump while you are pregnant. · Avoid getting very hot. Do not use saunas or hot tubs. Avoid staying out in the sun in hot weather for long periods. Take acetaminophen (Tylenol) to lower a high fever. · Do not take any over-the-counter or herbal medicines or supplements without talking to your doctor or pharmacist first.  When should you call for help? Call 911 anytime you think you may need emergency care. For example, call if:    · You passed out (lost consciousness).     · You have severe vaginal bleeding.     · You have severe pain in your belly or pelvis.     · You have had fluid gushing or leaking from your vagina and you know or think the umbilical cord is bulging into your vagina. If this happens, immediately get down on your knees so your rear end (buttocks) is higher than your head. This will decrease the pressure on the cord until help arrives.   [facility] your doctor now or seek immediate medical care if:    · You have signs of preeclampsia, such as:  ? Sudden swelling of your face, hands, or feet. ? New vision problems (such as dimness or blurring). ? A severe headache.     · You have any vaginal bleeding.     · You have belly pain or cramping.     · You have a fever.     · You have had regular contractions (with or without pain) for an hour.  This means that you have 8 or more within 1 hour or 4 or more in 20 minutes after you change your position and drink fluids.     · You have a sudden release of fluid from your vagina.     · You have low back pain or pelvic pressure that does not go away.     · You notice that your baby has stopped moving or is moving much less than normal.    Watch closely for changes in your health, and be sure to contact your doctor if you have any problems. Where can you learn more? Go to http://harshil-heydi.info/. Enter [id number] in the search box to learn more about \"Pregnancy Precautions: Care Instructions. \"  Current as of: September 5, 2018  Content Version: 11.9  © 3207-3191 [employer]. Care instructions adapted under license by [employer] (which disclaims liability or warranty for this information). If you have questions about a medical condition or this instruction, always ask your healthcare professional. [address] any warranty or liability for your use of this information.

## 2019-05-07 ENCOUNTER — ANESTHESIA EVENT (OUTPATIENT)
Dept: LAB | Age: 24
DRG: 560 | End: 2019-05-07
Payer: COMMERCIAL

## 2019-05-07 ENCOUNTER — HOSPITAL ENCOUNTER (INPATIENT)
Age: 24
LOS: 1 days | Discharge: HOME | DRG: 560 | End: 2019-05-09
Attending: OBSTETRICS & GYNECOLOGY | Admitting: OBSTETRICS & GYNECOLOGY
Payer: COMMERCIAL

## 2019-05-07 ENCOUNTER — ANESTHESIA (OUTPATIENT)
Dept: LAB | Age: 24
DRG: 560 | End: 2019-05-07
Payer: COMMERCIAL

## 2019-05-07 PROBLEM — O26.893 VAGINAL DISCHARGE DURING PREGNANCY IN THIRD TRIMESTER: Status: ACTIVE | Noted: 2019-05-07

## 2019-05-07 PROBLEM — N89.8 VAGINAL DISCHARGE DURING PREGNANCY IN THIRD TRIMESTER: Status: ACTIVE | Noted: 2019-05-07

## 2019-05-07 PROBLEM — O42.00 PROM WITH ONSET OF LABOR WITHIN 24 HOURS OF RUPTURE: Status: ACTIVE | Noted: 2019-05-07

## 2019-05-07 LAB
A1 MICROGLOB PLACENTAL VAG QL: POSITIVE
ABO + RH BLD: NORMAL
ARTERIAL PATENCY WRIST A: ABNORMAL
ARTERIAL PATENCY WRIST A: ABNORMAL
BASE DEFICIT BLD-SCNC: 5 MMOL/L
BASE DEFICIT BLD-SCNC: 5 MMOL/L
BDY SITE: ABNORMAL
BDY SITE: ABNORMAL
BLOOD GROUP ANTIBODIES SERPL: NORMAL
BODY TEMPERATURE: 98.6
BODY TEMPERATURE: 98.6
CO2 BLD-SCNC: 21 MMOL/L
CO2 BLD-SCNC: 23 MMOL/L
COLLECT TIME,HTIME: 1646
COLLECT TIME,HTIME: 1646
CONTROL LINE PRESENT?: NORMAL
ERYTHROCYTE [DISTWIDTH] IN BLOOD BY AUTOMATED COUNT: 22.9 % (ref 11.9–14.6)
EXPIRATION DATE: NORMAL
GAS FLOW.O2 O2 DELIVERY SYS: ABNORMAL L/MIN
GAS FLOW.O2 O2 DELIVERY SYS: ABNORMAL L/MIN
HCO3 BLD-SCNC: 21.6 MMOL/L (ref 22–26)
HCO3 BLDV-SCNC: 20.2 MMOL/L (ref 23–28)
HCT VFR BLD AUTO: 35.6 % (ref 35.8–46.3)
HGB BLD-MCNC: 11.6 G/DL (ref 11.7–15.4)
INTERNAL NEGATIVE CONTROL: NORMAL
KIT LOT NO.: NORMAL
MCH RBC QN AUTO: 27.8 PG (ref 26.1–32.9)
MCHC RBC AUTO-ENTMCNC: 32.6 G/DL (ref 31.4–35)
MCV RBC AUTO: 85.4 FL (ref 79.6–97.8)
NRBC # BLD: 0 K/UL (ref 0–0.2)
PCO2 BLDCO: 37 MMHG (ref 32–68)
PCO2 BLDCO: 44 MMHG (ref 32–68)
PH BLDCO: 7.3 [PH] (ref 7.15–7.38)
PH BLDCO: 7.35 [PH] (ref 7.15–7.38)
PLATELET # BLD AUTO: 175 K/UL (ref 150–450)
PMV BLD AUTO: 10 FL (ref 9.4–12.3)
PO2 BLDCO: 17 MMHG
PO2 BLDCO: 24 MMHG
RBC # BLD AUTO: 4.17 M/UL (ref 4.05–5.2)
SAO2 % BLD: 20 % (ref 95–98)
SAO2 % BLDV: 41 % (ref 65–88)
SERVICE CMNT-IMP: ABNORMAL
SERVICE CMNT-IMP: ABNORMAL
SPECIMEN EXP DATE BLD: NORMAL
SPECIMEN TYPE: ABNORMAL
SPECIMEN TYPE: ABNORMAL
WBC # BLD AUTO: 5.7 K/UL (ref 4.3–11.1)

## 2019-05-07 PROCEDURE — 76060000078 HC EPIDURAL ANESTHESIA

## 2019-05-07 PROCEDURE — 86900 BLOOD TYPING SEROLOGIC ABO: CPT

## 2019-05-07 PROCEDURE — 74011250636 HC RX REV CODE- 250/636: Performed by: OBSTETRICS & GYNECOLOGY

## 2019-05-07 PROCEDURE — 75410000002 HC LABOR FEE PER 1 HR

## 2019-05-07 PROCEDURE — 75410000000 HC DELIVERY VAGINAL/SINGLE

## 2019-05-07 PROCEDURE — 77030011943

## 2019-05-07 PROCEDURE — 77030018846 HC SOL IRR STRL H20 ICUM -A

## 2019-05-07 PROCEDURE — 82803 BLOOD GASES ANY COMBINATION: CPT

## 2019-05-07 PROCEDURE — 77030014125 HC TY EPDRL BBMI -B: Performed by: ANESTHESIOLOGY

## 2019-05-07 PROCEDURE — 74011250637 HC RX REV CODE- 250/637: Performed by: OBSTETRICS & GYNECOLOGY

## 2019-05-07 PROCEDURE — 75410000003 HC RECOV DEL/VAG/CSECN EA 0.5 HR

## 2019-05-07 PROCEDURE — 85027 COMPLETE CBC AUTOMATED: CPT

## 2019-05-07 PROCEDURE — 74011250636 HC RX REV CODE- 250/636

## 2019-05-07 PROCEDURE — 4A1HXCZ MONITORING OF PRODUCTS OF CONCEPTION, CARDIAC RATE, EXTERNAL APPROACH: ICD-10-PCS | Performed by: OBSTETRICS & GYNECOLOGY

## 2019-05-07 PROCEDURE — 99284 EMERGENCY DEPT VISIT MOD MDM: CPT

## 2019-05-07 PROCEDURE — A4300 CATH IMPL VASC ACCESS PORTAL: HCPCS | Performed by: ANESTHESIOLOGY

## 2019-05-07 PROCEDURE — 84112 EVAL AMNIOTIC FLUID PROTEIN: CPT | Performed by: OBSTETRICS & GYNECOLOGY

## 2019-05-07 RX ORDER — OXYTOCIN/RINGER'S LACTATE 30/500 ML
1-25 PLASTIC BAG, INJECTION (ML) INTRAVENOUS
Status: DISCONTINUED | OUTPATIENT
Start: 2019-05-07 | End: 2019-05-07

## 2019-05-07 RX ORDER — ACETAMINOPHEN 325 MG/1
650 TABLET ORAL
Status: DISCONTINUED | OUTPATIENT
Start: 2019-05-07 | End: 2019-05-09 | Stop reason: HOSPADM

## 2019-05-07 RX ORDER — ROPIVACAINE HYDROCHLORIDE 2 MG/ML
INJECTION, SOLUTION EPIDURAL; INFILTRATION; PERINEURAL AS NEEDED
Status: DISCONTINUED | OUTPATIENT
Start: 2019-05-07 | End: 2019-05-07 | Stop reason: HOSPADM

## 2019-05-07 RX ORDER — SIMETHICONE 80 MG
80 TABLET,CHEWABLE ORAL
Status: DISCONTINUED | OUTPATIENT
Start: 2019-05-07 | End: 2019-05-09 | Stop reason: HOSPADM

## 2019-05-07 RX ORDER — SODIUM CHLORIDE 0.9 % (FLUSH) 0.9 %
5-40 SYRINGE (ML) INJECTION AS NEEDED
Status: DISCONTINUED | OUTPATIENT
Start: 2019-05-07 | End: 2019-05-07 | Stop reason: HOSPADM

## 2019-05-07 RX ORDER — ROPIVACAINE HYDROCHLORIDE 2 MG/ML
INJECTION, SOLUTION EPIDURAL; INFILTRATION; PERINEURAL
Status: DISCONTINUED | OUTPATIENT
Start: 2019-05-07 | End: 2019-05-07 | Stop reason: HOSPADM

## 2019-05-07 RX ORDER — LIDOCAINE HYDROCHLORIDE 10 MG/ML
1 INJECTION INFILTRATION; PERINEURAL
Status: DISCONTINUED | OUTPATIENT
Start: 2019-05-07 | End: 2019-05-07 | Stop reason: HOSPADM

## 2019-05-07 RX ORDER — DEXTROSE, SODIUM CHLORIDE, SODIUM LACTATE, POTASSIUM CHLORIDE, AND CALCIUM CHLORIDE 5; .6; .31; .03; .02 G/100ML; G/100ML; G/100ML; G/100ML; G/100ML
125 INJECTION, SOLUTION INTRAVENOUS CONTINUOUS
Status: DISCONTINUED | OUTPATIENT
Start: 2019-05-07 | End: 2019-05-07 | Stop reason: HOSPADM

## 2019-05-07 RX ORDER — MINERAL OIL
120 OIL (ML) ORAL
Status: DISCONTINUED | OUTPATIENT
Start: 2019-05-07 | End: 2019-05-07 | Stop reason: HOSPADM

## 2019-05-07 RX ORDER — SODIUM CHLORIDE 0.9 % (FLUSH) 0.9 %
5-40 SYRINGE (ML) INJECTION EVERY 8 HOURS
Status: DISCONTINUED | OUTPATIENT
Start: 2019-05-07 | End: 2019-05-07 | Stop reason: HOSPADM

## 2019-05-07 RX ORDER — ONDANSETRON 8 MG/1
8 TABLET, ORALLY DISINTEGRATING ORAL
Status: DISCONTINUED | OUTPATIENT
Start: 2019-05-07 | End: 2019-05-09 | Stop reason: HOSPADM

## 2019-05-07 RX ORDER — BUTORPHANOL TARTRATE 2 MG/ML
1 INJECTION INTRAMUSCULAR; INTRAVENOUS
Status: DISCONTINUED | OUTPATIENT
Start: 2019-05-07 | End: 2019-05-07 | Stop reason: HOSPADM

## 2019-05-07 RX ORDER — DOCUSATE SODIUM 100 MG/1
100 CAPSULE, LIQUID FILLED ORAL 2 TIMES DAILY
Status: DISCONTINUED | OUTPATIENT
Start: 2019-05-07 | End: 2019-05-09 | Stop reason: HOSPADM

## 2019-05-07 RX ORDER — OXYTOCIN/RINGER'S LACTATE 15/250 ML
250 PLASTIC BAG, INJECTION (ML) INTRAVENOUS ONCE
Status: DISCONTINUED | OUTPATIENT
Start: 2019-05-07 | End: 2019-05-07 | Stop reason: HOSPADM

## 2019-05-07 RX ORDER — LIDOCAINE HYDROCHLORIDE 20 MG/ML
JELLY TOPICAL
Status: DISCONTINUED | OUTPATIENT
Start: 2019-05-07 | End: 2019-05-07 | Stop reason: HOSPADM

## 2019-05-07 RX ORDER — OXYTOCIN/RINGER'S LACTATE 15/250 ML
250 PLASTIC BAG, INJECTION (ML) INTRAVENOUS ONCE
Status: ACTIVE | OUTPATIENT
Start: 2019-05-07 | End: 2019-05-08

## 2019-05-07 RX ORDER — MONTELUKAST SODIUM 10 MG/1
10 TABLET ORAL
Status: DISCONTINUED | OUTPATIENT
Start: 2019-05-07 | End: 2019-05-09 | Stop reason: HOSPADM

## 2019-05-07 RX ORDER — DIPHENHYDRAMINE HCL 25 MG
25 CAPSULE ORAL
Status: DISCONTINUED | OUTPATIENT
Start: 2019-05-07 | End: 2019-05-09 | Stop reason: HOSPADM

## 2019-05-07 RX ORDER — METHYLERGONOVINE MALEATE 0.2 MG/ML
0.2 INJECTION INTRAVENOUS
Status: ACTIVE | OUTPATIENT
Start: 2019-05-07 | End: 2019-05-08

## 2019-05-07 RX ORDER — NALOXONE HYDROCHLORIDE 0.4 MG/ML
0.4 INJECTION, SOLUTION INTRAMUSCULAR; INTRAVENOUS; SUBCUTANEOUS ONCE
Status: ACTIVE | OUTPATIENT
Start: 2019-05-07 | End: 2019-05-08

## 2019-05-07 RX ADMIN — DOCUSATE SODIUM 100 MG: 100 CAPSULE, LIQUID FILLED ORAL at 20:14

## 2019-05-07 RX ADMIN — ACETAMINOPHEN 650 MG: 325 TABLET, FILM COATED ORAL at 20:14

## 2019-05-07 RX ADMIN — OXYTOCIN 2 MILLI-UNITS/MIN: 10 INJECTION, SOLUTION INTRAMUSCULAR; INTRAVENOUS at 10:21

## 2019-05-07 RX ADMIN — SODIUM CHLORIDE, SODIUM LACTATE, POTASSIUM CHLORIDE, AND CALCIUM CHLORIDE 1000 ML: 600; 310; 30; 20 INJECTION, SOLUTION INTRAVENOUS at 11:22

## 2019-05-07 RX ADMIN — OXYTOCIN 250 MILLI-UNITS/MIN: 10 INJECTION, SOLUTION INTRAMUSCULAR; INTRAVENOUS at 16:51

## 2019-05-07 RX ADMIN — ROPIVACAINE HYDROCHLORIDE 10 ML/HR: 2 INJECTION, SOLUTION EPIDURAL; INFILTRATION; PERINEURAL at 11:55

## 2019-05-07 RX ADMIN — SODIUM CHLORIDE, SODIUM LACTATE, POTASSIUM CHLORIDE, CALCIUM CHLORIDE, AND DEXTROSE MONOHYDRATE 125 ML/HR: 600; 310; 30; 20; 5 INJECTION, SOLUTION INTRAVENOUS at 09:41

## 2019-05-07 RX ADMIN — ROPIVACAINE HYDROCHLORIDE 8 ML: 2 INJECTION, SOLUTION EPIDURAL; INFILTRATION; PERINEURAL at 11:55

## 2019-05-07 RX ADMIN — Medication 1 AMPULE: at 10:11

## 2019-05-07 NOTE — L&D DELIVERY NOTE
Delivery Summary    Patient: Se Calhoun MRN: [id number]  SSN: [id number]    YOB: 1995  Age: 30 y.o. Sex: female       Information for the patient's :  Sharon Kilpatrick [[id number]]       Labor Events:    Labor: No    Steroids: None   Cervical Ripening Date/Time:       Cervical Ripening Type: None   Antibiotics During Labor: No   Rupture Identifier:      Rupture Date/Time: 2019 7:12 AM   Rupture Type: SROM   Amniotic Fluid Volume: Moderate    Amniotic Fluid Description: Clear    Amniotic Fluid Odor: None    Induction: None       Induction Date/Time:        Indications for Induction:      Augmentation: Oxytocin   Augmentation Date/Time: 20192:43 PM   Indications for Augmentation: Ineffective Contraction Pattern   Labor complications: None       Additional complications:        Delivery Events:  Indications For Episiotomy:     Episiotomy: None   Perineal Laceration(s): None   Repaired:     Periurethral Laceration Location:      Repaired:     Labial Laceration Location:     Repaired:     Sulcal Laceration Location:     Repaired:     Vaginal Laceration Location:     Repaired:     Cervical Laceration Location:     Repaired:     Repair Suture: None   Number of Repair Packets: 0   Estimated Blood Loss (ml):  ml     Delivery Date: 2019    Delivery Time: 4:46 PM  Delivery Type: Vaginal, Spontaneous  Sex:  Female    Gestational Age: 36w4d   Delivery Clinician:  Unique Dan  Living Status: Living   Delivery Location: L&D            APGARS  One minute Five minutes Ten minutes   Skin color: 0   1        Heart rate: 2   2        Grimace: 2   2        Muscle tone: 2   2        Breathin   2        Totals: 8   9            Presentation: Vertex    Position: Middle Occiput Anterior  Resuscitation Method:  Suctioning-bulb; Tactile Stimulation     Meconium Stained: None      Cord Information: 3 Vessels  Complications: None  Cord around:    Delayed cord clamping?  Yes  Cord clamped date/time:2019  4:47 PM  Disposition of Cord Blood: Lab    Blood Gases Sent?: Yes    Placenta:  Date/Time: 2019  4:51 PM  Removal: Spontaneous      Appearance: Normal      Measurements:  Birth Weight: 7 lb 10.6 oz (3.475 kg)      Birth Length: 52 cm      Head Circumference: 35.5 cm      Chest Circumference: 31.5 cm     Abdominal Girth: Other Providers:   KENDRA BLEVINS;CHRISTOPHER BORRERO;LUCI PARK;CHUCK HARRIS, Obstetrician;Primary Nurse;Primary  Nurse;[employer]       Group B Strep:   Lab Results   Component Value Date/Time    GrBStrep, External Negative 2019     Information for the patient's :  Jose Alberto Zuniga [[id number]]   No results found for: ABORH, PCTABR, PCTDIG, BILI, ABORHEXT, ABORH    No results for input(s): PCO2CB, PO2CB, HCO3I, SO2I, IBD, PTEMPI, SPECTI, PHICB, ISITE, IDEV, IALLEN in the last 72 hours. C/C/+2----> over an intact perineum, nares/mouth bulb suctioned on the perineum. Np nuchal cord    After delayed cord clamping the cord was doubly clamped and cut. Baby laid on mother's chest, nursing assisted [facility] with getting the baby skin to skin. 3V cord. Cord gas & cord blood obtained. Placenta spontaneous/intact. Intrauterine manual exploration:  no placental remnants were obtained, clots removed, fundus firm. Recto-vaginal exam:  intact along full extent, no buttonhole. No cervical/vaginal or periurethral lacs. No lacs. Mom/baby stable immediately post delivery. Baby girl \" Tiesha \".

## 2019-05-07 NOTE — H&P
History & Physical 
 
Name: Luis Miguel Nascimento MRN: [id number]  SSN: [id number] YOB: 1995  Age: 30 y.o. Sex: female Chief c/o: Luciano Gross Subjective:  
 
Estimated Date of Delivery: 19 OB History  Para Term  AB Living 4 2 2   1 2 SAB TAB Ectopic Molar Multiple Live Births 1       0 2 # Outcome Date GA Lbr Coleman/2nd Weight Sex Delivery Anes PTL Lv  
4 Current           
3 2018 2 Term 16 38w4d 17:30 / 00:15 3.61 kg F VAGINAL DELI EPIDURAL AN N ORALIA  
1 Term 13 39w1d 07:05 / 01:37 3.64 kg M VAGINAL DELI EPIDURAL AN N ORALIA Ms. Nayely Menjivar is admitted with pregnancy at 38w2d for prom. Prenatal course was normal. Please see prenatal records for details. Past Medical History:  
Diagnosis Date  Anemia  Anemia in pregnancy 2016  Celiac disease  Genital herpes, unspecified History  Herpes 1/10/2014  History of chicken pox  Mastitis 2 surgeries/right breast  
 Miscarriage within last 12 months 2018  Palpitations  Postpartum depression   
 no meds currently  Rh negative state in antepartum period 2013  Rhesus isoimmunization affecting pregnancy  Supervision of normal subsequent pregnancy 10/9/2015  
 1)HSV 2)rh neg 3)rubella nonimmune 4)anemia  Syncope and collapse  Unspecified ovarian cysts Past Surgical History:  
Procedure Laterality Date  BREAST SURGERY PROCEDURE UNLISTED  HX CHOLECYSTECTOMY  HX CYST INCISION AND DRAINAGE  2014 RIGHT INCISION AND DRAINAGE BREAST  performed by Elvin Villalobos MD at [address] [place] [address] HX CYST INCISION AND DRAINAGE  2014 INCISION AND DRAINAGE RIGHT BREAST performed by Elvin Villalobos MD at [facility] MAIN OR  
 HX OTHER SURGICAL    
 HX WISDOM TEETH EXTRACTION Social History Occupational History  Not on file Tobacco Use  Smoking status: Never Smoker  Smokeless tobacco: Never Used Substance and Sexual Activity  Alcohol use: No  
  Comment: social/none since + [facility]  Drug use: No  
 Sexual activity: Yes  
  Partners: Male Birth control/protection: None Comment: Pregnant Family History Problem Relation Age of Onset  Alcohol abuse Neg Hx  Arthritis-osteo Neg Hx  Cancer Neg Hx  Elevated Lipids Neg Hx   
 Heart Disease Neg Hx   
 Headache Neg Hx  Lung Disease Neg Hx  Colon Cancer Neg Hx  Hypertension Neg Hx  Diabetes Neg Hx Allergies Allergen Reactions  Oxycodone Other (comments) Pt reports not able to function on this Prior to Admission medications Medication Sig Start Date End Date Taking? Authorizing Provider  
valACYclovir (VALTREX) 1 gram tablet Take 1 Tab by mouth daily. 5/1/19   Alonso Vigil MD  
cyanocobalamin (VITAMIN B-12) 1,000 mcg/mL injection Inject 1 ml by IM route once weekly 4/15/19   Alonso Vigil MD  
montelukast (SINGULAIR) 10 mg tablet Take 10 mg by mouth daily. Provider, Historical  
prenatal 47/iron/folate 1/dha (PNV-DHA PO) Take  by mouth. Provider, Historical  
ferrous sulfate (IRON PO) Take  by mouth. Provider, Historical  
buPROPion XL (WELLBUTRIN XL) 150 mg tablet Take 150 mg by mouth every morning. Pt will start with a half tab    Provider, Historical  
  
 
Review of Systems: A comprehensive review of systems was negative except for that written in the HPI. Objective:  
 
Vitals: There were no vitals filed for this visit. Physical Exam: 
Patient without distress. Heart: Regular rate and rhythm Lung: clear to auscultation throughout lung fields, no wheezes, no rales, no rhonchi and normal respiratory effort Back: costovertebral angle tenderness absent Abdomen: soft, nontender Fundus: soft and non tender Perineum: blood absent, amniotic fluid present Cervical Exam: 3 cm dilated 70% effaced   
-2 station Lower Extremities:  - Edema No 
 - Patellar Reflexes: 2+ bilaterally Membranes:  Spontaneous Rupture of Membranes; Amniotic Fluid: clear fluid Fetal Heart Rate: Reactive Prenatal Labs:  
Lab Results Component Value Date/Time ABO/Rh(D) O NEGATIVE 2016 12:55 PM  
 Rubella, External Immune 2017 GrBStrep, External Negative 2019 HBsAg, External Negative 2017 HIV, External Negative 2017 RPR, External Negative 2017 Gonorrhea, External Negative 2018 Chlamydia, External Negative 2018 ABO,Rh O negaitve 2015 Assessment/Plan: Active Problems: 
  Vaginal discharge during pregnancy in third trimester (2019) PROM with onset of labor within 24 hours of rupture (2019) Plan: 31 yo  at 38w2d. Admit for prom. Group B Strep was negative. consider pitocin.  Reactive nst.

## 2019-05-07 NOTE — PROGRESS NOTES
5 Dr. Marcell Santiago in room, set up and polly wash for                   delivery. 1646  of viable female wt: 7 lbs 11 oz, l.5\" APGAR 8/9. 
1651 placenta delived, pitocin infusing.

## 2019-05-07 NOTE — PROGRESS NOTES
Labor and Delivery Shift Report - Out Bedside report given to CATHRYN Andres RN. Patient care relinquished. Report included, but not limited to the following: current intake/output totals, last cervical exam, current Pitocin rate, last time increased/decreased, last time of straight catheterization, and SBAR review.

## 2019-05-07 NOTE — ANESTHESIA PREPROCEDURE EVALUATION
Anesthetic History No history of anesthetic complications Review of Systems / Medical History Pertinent labs reviewed Pulmonary Within defined limits Neuro/Psych Within defined limits Cardiovascular Within defined limits Exercise tolerance: >4 METS 
  
GI/Hepatic/Renal 
Within defined limits Endo/Other Within defined limits Other Findings Comments: Denies Coagulapathies Physical Exam 
 
Airway Mallampati: I 
TM Distance: 4 - 6 cm Neck ROM: normal range of motion Mouth opening: Normal 
 
 Cardiovascular Regular rate and rhythm,  S1 and S2 normal,  no murmur, click, rub, or gallop Dental 
No notable dental hx Pulmonary Breath sounds clear to auscultation Abdominal 
GI exam deferred Other Findings Anesthetic Plan ASA: 2 Anesthesia type: epidural 
 
 
 
 
Induction: Intravenous Anesthetic plan and risks discussed with: Patient

## 2019-05-07 NOTE — PROGRESS NOTES
3-4/80/ -1, Pitocin @ 22 mu/min. Baseline 130s w/ good LTV and acels, some shallow varaibles. Resume peanut. Comfortable w/ HANG. Continue close surveillance.

## 2019-05-07 NOTE — PROGRESS NOTES
Summary note: 
Pt here to triage with c/o leaking of fluid since 0710 this am. Pos amnisure then sve by dr Aga Sylvester. 3 cms/ 70/-2 with apparent srom. Will admit to room 424 for labor. Pt moved to 424, admission process started. Iv started and labs drawn. Pt desires an enema prior to pitocin augmentation. Pt states \"all this iron is making me so constipated\". Verbal order from dr Aga Sylvester for enema prior to pitocin. sbar to Vera and pt assumed for care.

## 2019-05-07 NOTE — PROGRESS NOTES
Patient back to bed from bathroom. Patient states she had a bowel movement and feels better. Does not need enema at this time Will begin pitocin per MD orders.   Patient to let RN know when she is ready for epidural.  V/U

## 2019-05-07 NOTE — PROGRESS NOTES
05/07/19 1202 Cervical Exam  
Dilation (cm) 3 (per Steffanie ) Eff 80 % Station -2 Orders received to increase pitocin every 15 minutes

## 2019-05-07 NOTE — PROGRESS NOTES
SBAR OUT Report: Mother Verbal report given to Akua Leung RN on this patient, who is now being transferred to MIU for routine progression of care. The patient is not wearing a green \"Anesthesia-Duramorph\" band. Report consisted of patient's Situation, Background, Assessment and Recommendations (SBAR). [place] ID bands were compared with the identification form, and verified with the patient and receiving nurse. Information from the SBAR, Procedure Summary and Intake/Output and the [place] Report was reviewed with the receiving nurse; opportunity for questions and clarification provided.

## 2019-05-07 NOTE — ANESTHESIA PROCEDURE NOTES
Epidural Block Start time: 5/7/2019 11:46 AM 
End time: 5/7/2019 11:51 AM 
Performed by: Chau Dickerson MD 
Authorized by: Chau Dickerson MD  
 
Pre-Procedure Indication: at surgeon's request, post-op pain management, procedure for pain and labor epidural   
Preanesthetic Checklist: patient identified, risks and benefits discussed, anesthesia consent, site marked, patient being monitored, timeout performed and anesthesia consent Timeout Time: 11:45 Epidural:  
Patient position:  Seated Prep region:  Lumbar Prep: Chlorhexidine Location:  L3-4 Needle and Epidural Catheter:  
Needle Type:  Tuohy Needle Gauge:  17 G Injection Technique:  Loss of resistance using saline Attempts:  1 Catheter Size:  19 G Catheter at Skin Depth (cm):  11 Depth in Epidural Space (cm):  5.5 Events: no blood with aspiration, no cerebrospinal fluid with aspiration, no paresthesia and negative aspiration test   
Test Dose:  Lidocaine 1.5% w/ epi and negative Assessment:  
Catheter Secured:  Tegaderm and tape Insertion:  Uncomplicated Patient tolerance:  Patient tolerated the procedure well with no immediate complications

## 2019-05-07 NOTE — PROGRESS NOTES
EPIDURAL PLACEMENT Dr Tish Elder at bedside at [phone]. AMINTA Way at bedside at [facility] Assisted pt to sitting up on bedside at 1145. Timeout completed at [id number] [phone] with MD, AMINTA and myself at bedside. Test dose given at 1153. Negative reaction. Pt assisted to lying back in 1158  tilt position. See anesthesia record for details. See vital sign flow sheet for BP. Tolerated procedure well.

## 2019-05-07 NOTE — PROGRESS NOTES
Chart reviewed. GBS:  Neg  Glucola 72  (2/20/19) Definite ROM this am, does not believe she has had prolonged ROM ( came in for possible ROM last week, has not continued to have fluid leak out out until this am). Problem List  Date Reviewed: 5/3/2019 Codes Class Noted Vaginal discharge during pregnancy in third trimester ICD-10-CM: O26.893, N89.8 ICD-9-CM: 646.83, 623.5  5/7/2019 PROM with onset of labor within 24 hours of rupture ICD-10-CM: O42.00 ICD-9-CM: 658.10  5/7/2019 Amniotic fluid leaking ICD-10-CM: O42.90 ICD-9-CM: 658.10  5/3/2019 Abdominal pain during pregnancy in third trimester ICD-10-CM: O26.893, R10.9 ICD-9-CM: 646.83, 789.00  4/17/2019 Iron deficiency anemia during pregnancy ICD-10-CM: O99.019, D50.9 ICD-9-CM: 648.20, 280.9  4/11/2019 Anemia during pregnancy in third trimester ICD-10-CM: O99.013 ICD-9-CM: 725.93  3/27/2019 Overview Signed 3/27/2019  9:29 AM by Barby Keller MD  
  Pt sts compliance with Fe tid and pnv daily 
hgb still low. B12 also low. Refer heme onc Pelvic pain in antepartum period in third trimester ICD-10-CM: O26.893, R10.2 ICD-9-CM: 646.83, 625.9  3/20/2019 Nausea and vomiting ICD-10-CM: R11.2 ICD-9-CM: 787.01  3/1/2019 Antepartum dehydration ICD-10-CM: O26.899, E86.0 ICD-9-CM: 132.76, 276.51  3/1/2019 Pelvic pain in antepartum period in second trimester ICD-10-CM: O26.892, R10.2 ICD-9-CM: 646.83, 625.9  12/29/2018 Prenatal care of multigravida, antepartum ICD-10-CM: Z34.80 ICD-9-CM: V22.1  10/15/2018 Overview Addendum 2/21/2019  8:25 AM by Barby Keller MD  
  1)HSV 2)rh neg 
3)prenatal labs wnl except rubella NI. Urine cx pending--->neg 
4)anemia with hgb 10 at 27wks Herpes ICD-10-CM: B00.9 ICD-9-CM: 054.9  1/10/2014 Patient Vitals for the past 12 hrs: 
 Temp Pulse Resp BP  
05/07/19 1201  94  103/59 05/07/19 1200  87  105/64  
05/07/19 1159  91  109/62  
05/07/19 1158 98.5 °F (36.9 °C) 76  110/57  
05/07/19 1157  92  107/56  
05/07/19 1156  88  106/59  
05/07/19 1155  88  108/60  
05/07/19 1154  83  113/56  
05/07/19 1137  83  117/68  
05/07/19 1106  91  122/75  
05/07/19 1053  80  123/73  
05/07/19 1036  91  112/70  
05/07/19 1021  87  119/71  
05/07/19 0942 98.3 °F (36.8 °C) 77 18 114/72 Cervix:  3/80/-1 Membranes: SROM FHTs:   Baseline 150s w/ accels. Regular contractions Comfortable w/ HANG. Continue pitocin with close surveillance. Currently on 6 mu/min, will advance. Pt is proven to 8lb, EFW Anticipates a girl, \"Tiesha\". US 5/1/19:  EFW 8 lb 4oz 89%, AC 98%, TONEY 22, vtx. Proven to 8 lb, per pt did not push long w/ G2.  
 
H/o HSV, d/w pt, has been on prophylaxis, denies prodrome or sx suspicious for recent outbreak. Per RN pt is upset b/c Stevie Abbasi found out her BF is cheating.

## 2019-05-07 NOTE — PROGRESS NOTES
Dr Adrianna Marshall informed pt remains 3cm and pitocin is at 18milliunits. Orders received to increase pitocin to 26 milliunits.

## 2019-05-08 PROBLEM — O26.893 PELVIC PAIN IN ANTEPARTUM PERIOD IN THIRD TRIMESTER: Status: RESOLVED | Noted: 2019-03-20 | Resolved: 2019-05-08

## 2019-05-08 PROBLEM — N89.8 VAGINAL DISCHARGE DURING PREGNANCY IN THIRD TRIMESTER: Status: RESOLVED | Noted: 2019-05-07 | Resolved: 2019-05-08

## 2019-05-08 PROBLEM — R10.9 ABDOMINAL PAIN DURING PREGNANCY IN THIRD TRIMESTER: Status: RESOLVED | Noted: 2019-04-17 | Resolved: 2019-05-08

## 2019-05-08 PROBLEM — O26.899 ANTEPARTUM DEHYDRATION: Status: RESOLVED | Noted: 2019-03-01 | Resolved: 2019-05-08

## 2019-05-08 PROBLEM — O26.893 VAGINAL DISCHARGE DURING PREGNANCY IN THIRD TRIMESTER: Status: RESOLVED | Noted: 2019-05-07 | Resolved: 2019-05-08

## 2019-05-08 PROBLEM — E86.0 ANTEPARTUM DEHYDRATION: Status: RESOLVED | Noted: 2019-03-01 | Resolved: 2019-05-08

## 2019-05-08 PROBLEM — Z34.90 ENCOUNTER FOR INDUCTION OF LABOR: Status: ACTIVE | Noted: 2019-05-08

## 2019-05-08 PROBLEM — R10.2 PELVIC PAIN IN ANTEPARTUM PERIOD IN THIRD TRIMESTER: Status: RESOLVED | Noted: 2019-03-20 | Resolved: 2019-05-08

## 2019-05-08 PROBLEM — O26.893 ABDOMINAL PAIN DURING PREGNANCY IN THIRD TRIMESTER: Status: RESOLVED | Noted: 2019-04-17 | Resolved: 2019-05-08

## 2019-05-08 PROBLEM — R11.2 NAUSEA AND VOMITING: Status: RESOLVED | Noted: 2019-03-01 | Resolved: 2019-05-08

## 2019-05-08 PROCEDURE — 65270000029 HC RM PRIVATE

## 2019-05-08 PROCEDURE — 74011250637 HC RX REV CODE- 250/637: Performed by: OBSTETRICS & GYNECOLOGY

## 2019-05-08 PROCEDURE — 74011250636 HC RX REV CODE- 250/636: Performed by: OBSTETRICS & GYNECOLOGY

## 2019-05-08 RX ORDER — KETOROLAC TROMETHAMINE 30 MG/ML
30 INJECTION, SOLUTION INTRAMUSCULAR; INTRAVENOUS ONCE
Status: COMPLETED | OUTPATIENT
Start: 2019-05-08 | End: 2019-05-08

## 2019-05-08 RX ORDER — IBUPROFEN 800 MG/1
800 TABLET ORAL
Status: DISCONTINUED | OUTPATIENT
Start: 2019-05-08 | End: 2019-05-09 | Stop reason: HOSPADM

## 2019-05-08 RX ORDER — OXYCODONE HYDROCHLORIDE 5 MG/1
5 TABLET ORAL
Status: DISCONTINUED | OUTPATIENT
Start: 2019-05-08 | End: 2019-05-09 | Stop reason: HOSPADM

## 2019-05-08 RX ADMIN — ACETAMINOPHEN 650 MG: 325 TABLET, FILM COATED ORAL at 20:06

## 2019-05-08 RX ADMIN — KETOROLAC TROMETHAMINE 30 MG: 30 INJECTION, SOLUTION INTRAMUSCULAR at 00:24

## 2019-05-08 RX ADMIN — ACETAMINOPHEN 650 MG: 325 TABLET, FILM COATED ORAL at 06:01

## 2019-05-08 RX ADMIN — DOCUSATE SODIUM 100 MG: 100 CAPSULE, LIQUID FILLED ORAL at 17:00

## 2019-05-08 RX ADMIN — ACETAMINOPHEN 650 MG: 325 TABLET, FILM COATED ORAL at 12:26

## 2019-05-08 RX ADMIN — IBUPROFEN 800 MG: 800 TABLET ORAL at 17:01

## 2019-05-08 RX ADMIN — MONTELUKAST 10 MG: 10 TABLET, FILM COATED ORAL at 23:29

## 2019-05-08 RX ADMIN — IBUPROFEN 800 MG: 800 TABLET ORAL at 08:12

## 2019-05-08 NOTE — PROGRESS NOTES
SBAR IN Report: Mother Verbal report received from Alison Garza RN (full name & credentials) on this patient, who is now being transferred from  and D (unit) for routine progression of care. The patient is not wearing a green \"Anesthesia-Duramorph\" band. Report consisted of patient's Situation, Background, Assessment and Recommendations (SBAR). [place] ID bands were compared with the identification form, and verified with the patient and transferring nurse. Information from the SBAR and the [place] Report was reviewed with the transferring nurse; opportunity for questions and clarification provided.

## 2019-05-08 NOTE — PROGRESS NOTES
Chart reviewed - patient with history of postpartum depression.  met with patient who states that she experienced some postpartum depression after the birth of her second daughter (2016). Patient states that she felt \"sad and overwhelmed. \"  Patient also states that she was going through a \"bad break up with their father\" at the time. Patient states that this depressive episode lasted for 1 month. She was started on Wellbutrin and began therapy. Patient reports that she and FOB have reconciled and she's \"felt good\" emotionally during this pregnancy. Patient reports that she has a prescription for Wellbutrin, but \"doesn't take it regularly. \"  Per patient, she took the Wellbutrin 3 times during her pregnancy. Patient states that she and FOB have a very strong support system. Chart reviewed - no needs identified.  made introduction to family and provided informational packet on  mood disorder education/resources. Family receptive to receiving information and denied any additional needs from . Family has 's contact information should any needs/questions arise.  provided education and literature on support available thru [employer] ([employer]). [employer] Warmline:  5-174-449-4PPD (5711). WWW. POSTPARTUM. NET Family was informed of signs/symptoms, forms of intervention (medication, counseling, education), and resources (local coordinators available telephonically, monthly support group in [place], weekly \"chat with expert\" phone sessions). Additionally, patient was provided with [facility] Checklist.\" Discussed importance of self-care and accepting help when offered. Family was encouraged to contact me with any questions/needs -  contact information provided. Romi Tirado [address]

## 2019-05-08 NOTE — ANESTHESIA POSTPROCEDURE EVALUATION
* No procedures listed *. 
 
epidural 
 
Anesthesia Post Evaluation Multimodal analgesia: multimodal analgesia used between 6 hours prior to anesthesia start to PACU discharge Patient location during evaluation: bedside Patient participation: complete - patient participated Level of consciousness: responsive to verbal stimuli Pain management: adequate Airway patency: patent Anesthetic complications: no 
Cardiovascular status: hemodynamically stable Respiratory status: spontaneous ventilation Hydration status: stable Comments: The patient was satisfied with her labor epidural and denies any complications. Her lower extremities have returned to baseline neurologically. No vitals data found for the desired time range.

## 2019-05-08 NOTE — PROGRESS NOTES
Motrin 800 mg given po for complaints of cramping. 05/08/19 1721 Pain Assessment Pain Scale 1 Numeric (0 - 10) Pain Intensity 1 4 Pain Location 1 Abdomen Pain Orientation 1 Lower Pain Description 1 Cramping Pain Intervention(s) 1 Medication (see MAR)

## 2019-05-08 NOTE — PROGRESS NOTES
Progress Note Patient: Darrion Silva MRN: [id number]  SSN: [id number] YOB: 1995  Age: 30 y.o. Sex: female Postpartum Day Number 1 Subjective:  
 
Patient doing well postpartum without significant complaints. Voiding without difficulty. Patient reports normal lochia. Pain well controlled, voiding on her own without difficulty. Breast feeding. Denies HA, SOB/CP, RUQ pain, Vision changes. Objective:  
 
Patient Vitals for the past 12 hrs: 
 Temp Pulse Resp BP SpO2  
19 0840 98 °F (36.7 °C) 60 18 110/59 98 % Temp (24hrs), Av °F (36.7 °C), Min:97.6 °F (36.4 °C), Max:98.5 °F (36.9 °C) Physical Exam:   
Constitutional: She appears well-developed and well-nourished. No distress. HENT:   
Head: Normocephalic and atraumatic. Cardiovascular: RRR Pulmonary/Chest: CTAB Abd:  S/NTTP/ND, BS normoactive, fundus firm at umbilicus Ext:  No c/c/e Lab/Data Review: CBC:   
Recent Labs 19 
6429 WBC 5.7 HGB 11.6* HCT 35.6*  
 GBS:  
Lab Results Component Value Date/Time GrBStrep, External Negative 2019 Blood Type:  
Lab Results Component Value Date/Time ABO/Rh(D) O NEGATIVE 2019 09:18 AM  
 ABO,Rh O negaitve 2015 Infant's Blood Type: Information for the patient's :  Vallerie Phalen [[id number]] Lab Results Component Value Date/Time ABO/Rh(D) B NEGATIVE 2019 04:46 PM  
 
Fetal Screen:  
Lab Results Component Value Date/Time Fetal screen NEG 2016 06:19 AM  
 Carie: No results found for: EKLB Assessment and Plan:  
 
30 y.o.  ppd# 1 s/p : 
 
1) PP:  Meeting all pp goals, continue routine care; anticipate DC tomorrow 2) Breast feeding 3) Rh neg: baby B neg -->No Rhogam needed 4) Rub NI -->MMR prior to DC Signed By: Christal Varela MD   
 May 8, 2019

## 2019-05-08 NOTE — LACTATION NOTE

## 2019-05-08 NOTE — LACTATION NOTE
This note was copied from a baby's chart. In to see mom and infant for feeding observation. Observed mom latch baby to right breast in football. Good latch observed, active tugging. Reviewed 2nd 24 hr feeding/output expectations, cluster feeding, signs of good latch. Was not able to stay for full feeding as another pt called out for lactation assistance, however mom doing well and feels good about feeding. No further questions at this time. Lactation to follow up in am for discharge.

## 2019-05-09 VITALS
HEIGHT: 63 IN | OXYGEN SATURATION: 97 % | HEART RATE: 62 BPM | WEIGHT: 191 LBS | RESPIRATION RATE: 16 BRPM | TEMPERATURE: 97.5 F | SYSTOLIC BLOOD PRESSURE: 97 MMHG | BODY MASS INDEX: 33.84 KG/M2 | DIASTOLIC BLOOD PRESSURE: 62 MMHG

## 2019-05-09 PROCEDURE — 74011250636 HC RX REV CODE- 250/636: Performed by: OBSTETRICS & GYNECOLOGY

## 2019-05-09 PROCEDURE — 74011250637 HC RX REV CODE- 250/637: Performed by: OBSTETRICS & GYNECOLOGY

## 2019-05-09 PROCEDURE — 90707 MMR VACCINE SC: CPT | Performed by: OBSTETRICS & GYNECOLOGY

## 2019-05-09 RX ORDER — IBUPROFEN 800 MG/1
800 TABLET ORAL
Qty: 90 TAB | Refills: 0 | Status: SHIPPED | OUTPATIENT
Start: 2019-05-09 | End: 2019-06-18

## 2019-05-09 RX ADMIN — ACETAMINOPHEN 650 MG: 325 TABLET, FILM COATED ORAL at 04:07

## 2019-05-09 RX ADMIN — IBUPROFEN 800 MG: 800 TABLET ORAL at 01:27

## 2019-05-09 RX ADMIN — ACETAMINOPHEN 650 MG: 325 TABLET, FILM COATED ORAL at 12:05

## 2019-05-09 RX ADMIN — IBUPROFEN 800 MG: 800 TABLET ORAL at 09:50

## 2019-05-09 RX ADMIN — MEASLES, MUMPS, AND RUBELLA VIRUS VACCINE LIVE 0.5 ML: 1000; 12500; 1000 INJECTION, POWDER, LYOPHILIZED, FOR SUSPENSION SUBCUTANEOUS at 12:06

## 2019-05-09 RX ADMIN — DOCUSATE SODIUM 100 MG: 100 CAPSULE, LIQUID FILLED ORAL at 09:50

## 2019-05-09 NOTE — PROGRESS NOTES
05/08/19 2006 Pain Assessment Pain Scale 1 Numeric (0 - 10) Pain Intensity 1 2 Pain Location 1 Abdomen Pain Orientation 1 Lower Pain Description 1 Cramping Pain Intervention(s) 1 Medication (see MAR) Tylenol 650mg given for pain.

## 2019-05-09 NOTE — LACTATION NOTE
In to follow up with mom and infant prior to discharge to home. Infant was latched and nursing on mom's right breast in the football hold. Experienced mom stated that infant continues to latch and nurse well. Stated that she did formula supplement last night because infant would not settle. She stated that infant is calmer now. Reviewed discharge information to include engorgement. Mom has personal breast ump at home and I instructed her to take her breast pump kit home with her. Mom and infant are following up with [facility] and will see lactation consultant there.

## 2019-05-09 NOTE — PROGRESS NOTES
05/09/19 0407 Pain Assessment Pain Scale 1 Numeric (0 - 10) Pain Intensity 1 1 Pain Location 1 Abdomen Pain Orientation 1 Lower Pain Description 1 Cramping Pain Intervention(s) 1 Medication (see MAR) Tylenol 650mg given for pain.

## 2019-05-09 NOTE — PROGRESS NOTES
Post-Partum Day Number 2 Progress/Discharge Note Di Mina [id number] Patient doing well post-partum without significant complaint. Voiding without difficulty, normal lochia, positive flatus. Vitals:   
Patient Vitals for the past 8 hrs: 
 BP Temp Pulse Resp SpO2  
19 0820 97/62 97.5 °F (36.4 °C) 62 16 97 % Temp (24hrs), Av.9 °F (36.6 °C), Min:97.5 °F (36.4 °C), Max:98.3 °F (36.8 °C) Vital signs stable, afebrile. Exam:  Patient without distress. Abdomen soft, fundus firm at level of umbilicus, nontender Labs: No results found for this or any previous visit (from the past 24 hour(s)). Assessment and Plan:  Patient appears to be having uncomplicated post-partum course. Continue routine perineal care and maternal education. Plan discharge for today with follow up in our office in 6 weeks.

## 2019-05-09 NOTE — LACTATION NOTE
Attempted to latch infant to breast. Infant unable to latch, too frustrated. Tried to settle infant with skin to skin. Mom requested to start pumping. Set pt up with initiate setting, pumped for 15 minutes. Obtained drops of colostrum, given to infant via finger. Per mother request, infant given 12mL of good start formula via curved tip syringe. Infant tolerated well.

## 2019-05-09 NOTE — LACTATION NOTE
Mom and baby are going home today. Continue to offer the breast without restriction. Mom's milk should be fully in over the next few days. Reviewed engorgement precautions. Hand Expression has been demoed and written hand-out reviewed. As milk comes in baby will be more alert at the breast and swallows will be more obvious. Breasts may feel softer once baby has finished nursing. Baby should be back to birth weight by 3weeks of age. And then gain on average 1 oz per day for the next 2-3 months. Reviewed babies should be exclusively breastfeeding for the first 6 months and that breastfeeding should continue after introduction of appropriate complimentary foods after 6 months. Initial output should be at least 1 wet and 1 bowel movement for each day old baby is. By day 5-7 once milk is fully in baby will consistently have 6 or more soaking wet diapers and about 4 bowel movement. Some babies have a bowel movement with every feeding and some have 1-3 large bowel movements each day. Inadequate output may indicate inadequate feedings and should be reported to your Pediatrician. Bowel habits may change as baby gets older. Encouraged follow-up at Pediatrician in 1-2 days, no later than 1 week of age. Call [facility] for any questions as needed or to set up an OP visit. OP phone calls are returned within 24 hours. Community Breastfeeding Resource List given.

## 2019-05-09 NOTE — DISCHARGE INSTRUCTIONS
Patient Education        Vaginal Childbirth: Care Instructions  Your Care Instructions    Your body will slowly heal in the next few weeks. It is easy to get too tired and overwhelmed during the first weeks after your baby is born. Changes in your hormones can shift your mood without warning. You may find it hard to meet the extra demands on your energy and time. Take it easy on yourself. Follow-up care is a key part of your treatment and safety. Be sure to make and go to all appointments, and call your doctor if you are having problems. It's also a good idea to know your test results and keep a list of the medicines you take. How can you care for yourself at home? · Vaginal bleeding and cramps  ? After delivery, you will have a bloody discharge from the vagina. This will turn pink within a week and then white or yellow after about 10 days. It may last for 2 to 4 weeks or longer, until the uterus has healed. Use pads instead of tampons until you stop bleeding. ? Do not worry if you pass some blood clots, as long as they are smaller than a golf ball. If you have a tear or stitches in your vaginal area, change the pad at least every 4 hours to prevent soreness and infection. ? You may have cramps for the first few days after childbirth. These are normal and occur as the uterus shrinks to normal size. Take an over-the-counter pain medicine, such as acetaminophen (Tylenol), ibuprofen (Advil, Motrin), or naproxen (Aleve), for cramps. Read and follow all instructions on the label. Do not take aspirin, because it can cause more bleeding. ? Do not take two or more pain medicines at the same time unless the doctor told you to. Many pain medicines have acetaminophen, which is Tylenol. Too much acetaminophen (Tylenol) can be harmful. · Stitches  ? If you have stitches, they will dissolve on their own and do not need to be removed. Follow your doctor's instructions for cleaning the stitched area.   ? Put ice or a cold pack on your painful area for 10 to 20 minutes at a time, several times a day, for the first few days. Put a thin cloth between the ice and your skin. ? Sit in a few inches of warm water (sitz bath) 3 times a day and after bowel movements. The warm water helps with pain and itching. If you do not have a tub, a warm shower might help. · Breast fullness  ? Your breasts may overfill (engorge) in the first few days after delivery. To help milk flow and to relieve pain, warm your breasts in the shower or by using warm, moist towels before nursing. ? If you are not nursing, do not put warmth on your breasts or touch your breasts. Wear a tight bra or sports bra and use ice until the fullness goes away. This usually takes 2 to 3 days. ? Put ice or a cold pack on your breast after nursing to reduce swelling and pain. Put a thin cloth between the ice and your skin. · Activity  ? Eat a balanced diet. Do not try to lose weight by cutting calories. Keep taking your prenatal vitamins, or take a multivitamin. ? Get as much rest as you can. Try to take naps when your baby sleeps during the day. ? Get some exercise every day. But do not do any heavy exercise until your doctor says it is okay. ? Wait until you are healed (about 4 to 6 weeks) before you have sexual intercourse. Your doctor will tell you when it is okay to have sex. ? Talk to your doctor about birth control. You can get pregnant even before your period returns. Also, you can get pregnant while you are breastfeeding. · Mental health  ? It is normal to have some sadness, anxiety, sleeplessness, and mood swings after you go home. If you feel upset or hopeless for more than a few days or are having trouble doing the things you need to do, talk to your doctor. · Constipation and hemorrhoids  ? Drink plenty of fluids, enough so that your urine is light yellow or clear like water.  If you have kidney, heart, or liver disease and have to limit fluids, talk with your doctor before you increase the amount of fluids you drink. ? Eat plenty of fiber each day. Have a bran muffin or bran cereal for breakfast, and try eating a piece of fruit for a mid-afternoon snack. ? For painful, itchy hemorrhoids, put ice or a cold pack on the area several times a day for 10 minutes at a time. Follow this by putting a warm compress on the area for another 10 to 20 minutes or by sitting in a shallow, warm bath. When should you call for help? Call 911 anytime you think you may need emergency care. For example, call if:    · You passed out (lost consciousness).    Call your doctor now or seek immediate medical care if:    · You have severe vaginal bleeding.     · You are dizzy or lightheaded, or you feel like you may faint.     · You have a fever.     · You have new or more pain in your belly or pelvis.    Watch closely for changes in your health, and be sure to contact your doctor if:    · Your vaginal bleeding seems to be getting heavier.     · You have new or worse vaginal discharge.     · You feel sad, anxious, or hopeless for more than a few days.     · You do not get better as expected. Where can you learn more? Go to http://harshil-heydi.info/. Enter I309 in the search box to learn more about \"Vaginal Childbirth: Care Instructions. \"  Current as of: September 5, 2018  Content Version: 11.9  © 1124-5891 [employer], [employer]. Care instructions adapted under license by [employer] (which disclaims liability or warranty for this information). If you have questions about a medical condition or this instruction, always ask your healthcare professional. [place] [address] any warranty or liability for your use of this information.

## 2019-05-09 NOTE — DISCHARGE SUMMARY
Obstetrical Discharge Summary     Name: Rossana Keita MRN: [id number]  SSN: [id number]    YOB: 1995  Age: 30 y.o. Sex: female      Allergies: Oxycodone    Admit Date: 2019    Discharge Date: 2019     Admitting Physician: Evie Gore MD     Attending Physician:  Carissa Calvert MD     * Admission Diagnoses: Vaginal discharge during pregnancy in third trimester [O26.893, N89.8]; PROM with onset of labor within 24 hours of rupture [O42.00]; Encounter for induction of labor [Z34.90]    * Discharge Diagnoses:   Information for the patient's :  Glenn Mooring [[id number]]   Delivery of a 7 lb 10.6 oz (3.475 kg) female infant via Vaginal, Spontaneous on 2019 at 4:46 PM  by . Apgars were 8 and 9.        Additional Diagnoses:   Hospital Problems as of 2019 Date Reviewed: 2019          Codes Class Noted - Resolved POA    Encounter for induction of labor ICD-10-CM: Z34.90  ICD-9-CM: V22.1  2019 - Present Unknown        PROM with onset of labor within 24 hours of rupture ICD-10-CM: O42.00  ICD-9-CM: 658.10  2019 - Present Unknown        RESOLVED: Vaginal discharge during pregnancy in third trimester ICD-10-CM: O26.893, N89.8  ICD-9-CM: 646.83, 623.5  2019 - 2019 Unknown             Lab Results   Component Value Date/Time    ABO/Rh(D) O NEGATIVE 2019 09:18 AM    Rubella, External Immune 2017    GrBStrep, External Negative 2019    ABO,Rh O negaitve 2015      Immunization History   Administered Date(s) Administered    Immune Globulin (IM) 2013    Influenza Vaccine 10/23/2013    Influenza Vaccine (Quad) PF 10/23/2018    MMR 2013, 2016    Rho(D) Immune Globulin - IM 2016, 2016    Tdap 2016, 2019       * Procedures: vag delivery        [place]  Depression Scale  I have been able to laugh and see the funny side of things: As much as I always could  I have looked forward with enjoyment to things: As much as I ever did  I have blamed myself unnecessarily when things went wrong: Not very often  I have been anxious or worried for no good reason: Yes, sometimes  I have felt scared or panicky for no very good reason: No, not at all  Things have been getting on top of me: No, I have been coping as well as ever  I have been so unhappy that I have had difficulty sleeping: No, not at all  I have felt sad or miserable: No, not at all  I have been so unhappy that I have been crying: No, never  The thought of harming myself has occurred to me: Never  Total Score: 3    * Discharge Condition: good    * Hospital Course: Normal hospital course following the delivery. * Disposition: Home    Discharge Medications:   Current Discharge Medication List      START taking these medications    Details   ibuprofen (MOTRIN) 800 mg tablet Take 1 Tab by mouth every eight (8) hours as needed for Pain. Qty: 90 Tab, Refills: 0         CONTINUE these medications which have NOT CHANGED    Details   cyanocobalamin (VITAMIN B-12) 1,000 mcg/mL injection Inject 1 ml by IM route once weekly  Qty: 1 Vial, Refills: 1      montelukast (SINGULAIR) 10 mg tablet Take 10 mg by mouth daily. prenatal 47/iron/folate 1/dha (PNV-DHA PO) Take  by mouth. buPROPion XL (WELLBUTRIN XL) 150 mg tablet Take 150 mg by mouth every morning. Pt will start with a half tab         STOP taking these medications       valACYclovir (VALTREX) 1 gram tablet Comments:   Reason for Stopping:               * Follow-up Care/Patient Instructions:   Activity: No sex for 6 weeks and No driving while on analgesics  Diet: Regular Diet  Wound Care: As directed    Follow-up Information     Follow up With Specialties Details Why Frank Larkin MD [address]  [phone]

## 2019-05-09 NOTE — PROGRESS NOTES
05/09/19 0127 Pain Assessment Pain Scale 1 Numeric (0 - 10) Pain Intensity 1 3 Pain Location 1 Abdomen Pain Orientation 1 Lower Pain Description 1 Cramping Pain Intervention(s) 1 Medication (see MAR) Motrin 800mg given for pt complaint of cramping.

## 2022-03-18 PROBLEM — O42.90 AMNIOTIC FLUID LEAKING: Status: ACTIVE | Noted: 2019-05-03

## 2022-03-19 PROBLEM — Z34.90 ENCOUNTER FOR INDUCTION OF LABOR: Status: ACTIVE | Noted: 2019-05-08

## 2022-03-19 PROBLEM — Z34.80 PRENATAL CARE OF MULTIGRAVIDA, ANTEPARTUM: Status: ACTIVE | Noted: 2018-10-15

## 2022-03-19 PROBLEM — R10.2 PELVIC PAIN IN ANTEPARTUM PERIOD IN SECOND TRIMESTER: Status: ACTIVE | Noted: 2018-12-29

## 2022-03-19 PROBLEM — O42.00 PROM WITH ONSET OF LABOR WITHIN 24 HOURS OF RUPTURE: Status: ACTIVE | Noted: 2019-05-07

## 2022-03-19 PROBLEM — O26.892 PELVIC PAIN IN ANTEPARTUM PERIOD IN SECOND TRIMESTER: Status: ACTIVE | Noted: 2018-12-29

## 2022-03-19 PROBLEM — O99.013 ANEMIA DURING PREGNANCY IN THIRD TRIMESTER: Status: ACTIVE | Noted: 2019-03-27

## 2022-03-20 PROBLEM — D50.9 IRON DEFICIENCY ANEMIA DURING PREGNANCY: Status: ACTIVE | Noted: 2019-04-11

## 2022-03-20 PROBLEM — O99.019 IRON DEFICIENCY ANEMIA DURING PREGNANCY: Status: ACTIVE | Noted: 2019-04-11

## 2024-01-30 ENCOUNTER — HOSPITAL ENCOUNTER (OUTPATIENT)
Dept: MAMMOGRAPHY | Age: 29
Discharge: HOME | End: 2024-02-02
Payer: COMMERCIAL

## 2024-01-30 VITALS — HEIGHT: 63 IN | BODY MASS INDEX: 31.54 KG/M2 | WEIGHT: 178 LBS

## 2024-01-30 DIAGNOSIS — N61.0 NIPPLE INFECTION: ICD-10-CM

## 2024-01-30 PROCEDURE — G0279 TOMOSYNTHESIS, MAMMO: HCPCS

## 2024-01-30 PROCEDURE — 76642 ULTRASOUND BREAST LIMITED: CPT
